# Patient Record
Sex: FEMALE | Race: BLACK OR AFRICAN AMERICAN | URBAN - METROPOLITAN AREA
[De-identification: names, ages, dates, MRNs, and addresses within clinical notes are randomized per-mention and may not be internally consistent; named-entity substitution may affect disease eponyms.]

---

## 2017-03-17 ENCOUNTER — TELEPHONE (OUTPATIENT)
Dept: FAMILY MEDICINE CLINIC | Age: 52
End: 2017-03-17

## 2017-03-17 NOTE — TELEPHONE ENCOUNTER
Phoned patient she stated taking Mucinex drainage clear no fever she also stated feeling better and will call Monday 3/20/2017 if not getting better.

## 2017-04-26 ENCOUNTER — OFFICE VISIT (OUTPATIENT)
Dept: FAMILY MEDICINE CLINIC | Age: 52
End: 2017-04-26

## 2017-04-26 VITALS
WEIGHT: 171.2 LBS | RESPIRATION RATE: 12 BRPM | TEMPERATURE: 97.4 F | HEIGHT: 67 IN | OXYGEN SATURATION: 95 % | SYSTOLIC BLOOD PRESSURE: 117 MMHG | BODY MASS INDEX: 26.87 KG/M2 | DIASTOLIC BLOOD PRESSURE: 71 MMHG | HEART RATE: 65 BPM

## 2017-04-26 DIAGNOSIS — Z83.3 FAMILY HISTORY OF DIABETES MELLITUS: ICD-10-CM

## 2017-04-26 DIAGNOSIS — R73.03 PREDIABETES: Primary | ICD-10-CM

## 2017-04-26 LAB — HBA1C MFR BLD HPLC: 5.3 %

## 2017-04-26 RX ORDER — BETAMETHASONE DIPROPIONATE 0.5 MG/G
OINTMENT TOPICAL 2 TIMES DAILY
Qty: 15 G | Refills: 0 | Status: SHIPPED | OUTPATIENT
Start: 2017-04-26 | End: 2017-06-01

## 2017-04-26 NOTE — PROGRESS NOTES
Gerry Koenig        Name and  verified          Chief Complaint   Patient presents with    Follow-up

## 2017-04-26 NOTE — PROGRESS NOTES
HISTORY OF PRESENT ILLNESS  Joann Sanchez is a 46 y.o. female. HPI     For follow-up with past medical history of prediabetic state and was told to have hypercholesterolemia stating that she has been getting her labs done as per his OB/GYN has been doing her lipid panel and hemoglobin A1c levels on every 3 months in a fasting state most recently she had her hemoglobin A1c of a1c 5.7%, hdl 79, ldl 110, tot chl 207, in addition she was told that these numbers are not into comfortable range patient need to get them checked at least every 3 months with him for a better outcome patient denies any complaint no family history of premature heart disease non-smoker no history of hypertension has an active lifestyle workout 30 minutes most days of the week watchful about her diet does not eat fatty food high cholesterol and fast food eating as much as she can her expensive organic food, today she is questioning her last hemoglobin A1c and her lipid panel results like to further discuss with abnormality at her office visit today otherwise patient does not have any other complaint any other concern at this time    Current Outpatient Prescriptions   Medication Sig Dispense Refill    betamethasone dipropionate (DIPROLENE) 0.05 % ointment Apply  to affected area two (2) times a day. 15 g 0    MULTIVITAMIN PO Take  by mouth.  ciclopirox (PENLAC) 8 % solution One drop twice 1 Bottle 5     Allergies   Allergen Reactions    Latex Hives    Erythromycin Hives and Itching     Past Medical History:   Diagnosis Date    Constipation     Family history of diabetes mellitus 5/6/2014    Hypercholesterolemia 5/6/2014    Onychomycosis 11/4/2015    Prediabetes 11/4/2015     History reviewed. No pertinent surgical history.   Family History   Problem Relation Age of Onset    Diabetes Mother     Psychiatric Disorder Mother     Heart Disease Maternal Grandmother      Social History   Substance Use Topics    Smoking status: Never Smoker    Smokeless tobacco: Never Used    Alcohol use Yes      Comment: Very ocassionally      Lab Results  Component Value Date/Time   WBC 9.2 05/04/2016 08:12 AM   HGB 13.4 05/04/2016 08:12 AM   HCT 39.6 05/04/2016 08:12 AM   PLATELET 720 20/65/6440 08:12 AM   MCV 90 05/04/2016 08:12 AM       Lab Results  Component Value Date/Time   Hemoglobin A1c 5.8 11/04/2015 08:21 AM   Hemoglobin A1c 5.8 05/07/2015 09:20 AM   Hemoglobin A1c 5.7 05/06/2014 09:46 AM   Glucose 90 05/04/2016 08:12 AM   LDL, calculated 89 05/04/2016 08:12 AM   Creatinine 0.86 05/04/2016 08:12 AM      Lab Results  Component Value Date/Time   Cholesterol, total 185 05/04/2016 08:12 AM   HDL Cholesterol 88 05/04/2016 08:12 AM   LDL, calculated 89 05/04/2016 08:12 AM   Triglyceride 38 05/04/2016 08:12 AM       Lab Results  Component Value Date/Time   ALT (SGPT) 15 05/04/2016 08:12 AM   AST (SGOT) 14 05/04/2016 08:12 AM   Alk. phosphatase 77 05/04/2016 08:12 AM   Bilirubin, total 0.3 05/04/2016 08:12 AM       Lab Results  Component Value Date/Time   GFR est AA 90 05/04/2016 08:12 AM   GFR est non-AA 78 05/04/2016 08:12 AM   Creatinine 0.86 05/04/2016 08:12 AM   BUN 14 05/04/2016 08:12 AM   Sodium 141 05/04/2016 08:12 AM   Potassium 4.3 05/04/2016 08:12 AM   Chloride 101 05/04/2016 08:12 AM   CO2 23 05/04/2016 08:12 AM         Review of Systems   Constitutional: Negative for chills and fever. HENT: Negative for ear pain and nosebleeds. Eyes: Negative for blurred vision, pain and discharge. Respiratory: Negative for shortness of breath. Cardiovascular: Negative for chest pain and leg swelling. Gastrointestinal: Negative for constipation, diarrhea, nausea and vomiting. Genitourinary: Negative for frequency. Musculoskeletal: Negative for joint pain. Skin: Negative for itching and rash. Neurological: Negative for headaches. Psychiatric/Behavioral: Negative for depression. The patient is not nervous/anxious.         Physical Exam Constitutional: She is oriented to person, place, and time. She appears well-developed and well-nourished. HENT:   Head: Normocephalic and atraumatic. Eyes: Conjunctivae and EOM are normal.   Neck: Normal range of motion. Neck supple. Cardiovascular: Normal rate, regular rhythm and normal heart sounds. No murmur heard. Pulmonary/Chest: Effort normal and breath sounds normal.   Abdominal: Soft. Bowel sounds are normal. She exhibits no distension. Musculoskeletal: Normal range of motion. She exhibits no edema. Neurological: She is alert and oriented to person, place, and time. Skin: No erythema. Psychiatric: Her behavior is normal.   Nursing note and vitals reviewed. ASSESSMENT and PLAN  Eddie Quinonez was seen today for follow-up. Diagnoses and all orders for this visit:    Prediabetes  -     betamethasone dipropionate (DIPROLENE) 0.05 % ointment; Apply  to affected area two (2) times a day. -     AMB POC HEMOGLOBIN A1C    Family history of diabetes mellitus  -     betamethasone dipropionate (DIPROLENE) 0.05 % ointment; Apply  to affected area two (2) times a day.   -     AMB POC HEMOGLOBIN A1C    Patient was told she does not have to stress herself out because of the slight elevation of her last hemoglobin A1c in fact her hemoglobin A1c was checked today and her level was at 5.3 percentile which indicate an non-prediabetic state in addition she has great amount with good cholesterol and based on the risk factors her bad cholesterol of 110 is also normal for her was told to continue with her active lifestyle diet exercise behavioral therapy she does not have to worry and stress herself out because of this slight abnormality therefore she does not have to have her level checked every 3 months if she like to be conservative regarding her labs she can have them check  once yearly at the most she acknowledged understanding and agreed with today's recommendation

## 2017-04-26 NOTE — MR AVS SNAPSHOT
Visit Information Date & Time Provider Department Dept. Phone Encounter #  
 4/26/2017  3:45 PM Christiano Hoff MD [address] [facility] [phone] [id number] Upcoming Health Maintenance Date Due DTaP/Tdap/Td series (1 - Tdap) 12/27/1984 FOBT Q 1 YEAR AGE 50-75 5/7/2016 INFLUENZA AGE 9 TO ADULT 8/1/2016 PAP AKA CERVICAL CYTOLOGY 5/7/2018 BREAST CANCER SCRN MAMMOGRAM 8/4/2018 Allergies as of 4/26/2017  Review Complete On: 4/26/2017 By: Kathy Zhu LPN Severity Noted Reaction Type Reactions Latex  05/06/2014   Side Effect Hives Erythromycin  05/06/2014   Side Effect Hives, Itching Current Immunizations  Reviewed on 5/4/2016 Name Date Influenza Vaccine 9/16/2015, 11/6/2013 Pneumococcal Polysaccharide (PPSV-23)  Deferred (Patient Refused) Tdap  Deferred (Patient Refused) Not reviewed this visit You Were Diagnosed With   
  
 Codes Comments Prediabetes    -  Primary ICD-10-CM: R73.03 
ICD-9-CM: 790.29 Family history of diabetes mellitus     ICD-10-CM: Z83.3 ICD-9-CM: V18.0 Vitals BP Pulse Temp Resp Height(growth percentile) Weight(growth percentile) 117/71 (BP 1 Location: Left arm, BP Patient Position: At rest) 65 97.4 °F (36.3 °C) (Oral) 12 5' 7\" (1.702 m) 171 lb 3.2 oz (77.7 kg) SpO2 BMI OB Status Smoking Status 95% 26.81 kg/m2 Having regular periods Never Smoker Vitals History BMI and BSA Data Body Mass Index Body Surface Area  
 26.81 kg/m 2 1.92 m 2 Preferred Pharmacy Pharmacy Name Phone [facility]/PHARMACY [id number], [place] - [address] [phone] Your Updated Medication List  
  
   
This list is accurate as of: 4/26/17  4:50 PM.  Always use your most recent med list.  
  
  
  
  
 betamethasone dipropionate 0.05 % ointment Commonly known as:  Jose Blanton  
 Apply  to affected area two (2) times a day. ciclopirox 8 % solution Commonly known as:  PENLAC One drop twice MULTIVITAMIN PO Take  by mouth. Prescriptions Sent to Pharmacy Refills  
 betamethasone dipropionate (DIPROLENE) 0.05 % ointment 0 Sig: Apply  to affected area two (2) times a day. Class: Normal  
 Pharmacy: [facility]/pharmacy [address]  #: [phone] Route: Topical  
  
We Performed the Following AMB POC HEMOGLOBIN A1C [09571 CPT(R)] Introducing [facility]! [employer] introduces [employer] patient portal. Now you can access parts of your medical record, email your doctor's office, and request medication refills online. 1. In your internet browser, go to [email or web address] 2. Click on the First Time User? Click Here link in the Sign In box. You will see the New Member Sign Up page. 3. Enter your [employer] Access Code exactly as it appears below. You will not need to use this code after youve completed the sign-up process. If you do not sign up before the expiration date, you must request a new code. · [employer] Access Code: [email or web address] Expires: 7/25/2017  4:50 PM 
 
4. Enter the last four digits of your Social Security Number (xxxx) and Date of Birth (mm/dd/yyyy) as indicated and click Submit. You will be taken to the next sign-up page. 5. Create a [employer] ID. This will be your Captricity login ID and cannot be changed, so think of one that is secure and easy to remember. 6. Create a [employer] password. You can change your password at any time. 7. Enter your Password Reset Question and Answer. This can be used at a later time if you forget your password. 8. Enter your e-mail address. You will receive e-mail notification when new information is available in [address] 9. Click Sign Up. You can now view and download portions of your medical record. 10. Click the Download Summary menu link to download a portable copy of your medical information. If you have questions, please visit the Frequently Asked Questions section of the [employer] website. Remember, [employer] is NOT to be used for urgent needs. For medical emergencies, dial 911. Now available from your iPhone and Android! Please provide this summary of care documentation to your next provider. Your primary care clinician is listed as Brooks Meza. If you have any questions after today's visit, please call [phone].

## 2017-06-01 RX ORDER — TRIAMCINOLONE ACETONIDE 1 MG/G
CREAM TOPICAL 2 TIMES DAILY
Qty: 45 G | Refills: 4 | Status: SHIPPED | OUTPATIENT
Start: 2017-06-01 | End: 2018-07-25 | Stop reason: ALTCHOICE

## 2018-06-28 ENCOUNTER — OFFICE VISIT (OUTPATIENT)
Dept: FAMILY MEDICINE CLINIC | Age: 53
End: 2018-06-28

## 2018-06-28 VITALS
SYSTOLIC BLOOD PRESSURE: 99 MMHG | HEIGHT: 67 IN | RESPIRATION RATE: 16 BRPM | WEIGHT: 176.2 LBS | DIASTOLIC BLOOD PRESSURE: 67 MMHG | BODY MASS INDEX: 27.65 KG/M2 | TEMPERATURE: 98.1 F | OXYGEN SATURATION: 96 % | HEART RATE: 65 BPM

## 2018-06-28 DIAGNOSIS — Z12.39 SCREENING FOR MALIGNANT NEOPLASM OF BREAST: ICD-10-CM

## 2018-06-28 DIAGNOSIS — Z00.00 WELL WOMAN EXAM (NO GYNECOLOGICAL EXAM): Primary | ICD-10-CM

## 2018-06-28 DIAGNOSIS — Z23 ENCOUNTER FOR IMMUNIZATION: ICD-10-CM

## 2018-06-28 DIAGNOSIS — Z12.39 SCREENING FOR BREAST CANCER: ICD-10-CM

## 2018-06-28 RX ORDER — POLYETHYLENE GLYCOL-3350 AND ELECTROLYTES WITH FLAVOR PACK 240; 5.84; 2.98; 6.72; 22.72 G/278.26G; G/278.26G; G/278.26G; G/278.26G; G/278.26G
POWDER, FOR SOLUTION ORAL
Refills: 0 | COMMUNITY
Start: 2018-04-11 | End: 2018-06-28 | Stop reason: ALTCHOICE

## 2018-06-28 NOTE — PROGRESS NOTES
Discussed the patient's BMI with her. The BMI follow up plan is as follows:     dietary management education, guidance, and counseling  encourage exercise  monitor weight  prescribed dietary intake    An After Visit Summary was printed and given to the patient. Subjective:   45 y.o. female for Well Woman Check. Her gyne and breast care is done elsewhere by her Ob-Gyne physician. Her gyne and breast care is done elsewhere by her Ob-Gyne physician. Present for CPE, last Complete Physical exam was 2015, not Up todate w/ all vaccination, last tetanus vaccine was in <10yrs ago refusing to get any vaccinations today,   last mammog was nl and 2017,  last pap smear normal and was in 2018,   last colonoscopy was 2018,  No past surgical hx,  last bone dexa scan was never  No family hx of breast cancer   no family hx of colon cancer,+++sexaully active and uses Safe sex, not having much of physically activity,  compliant w/ meds,no Rf needed for today for her meds.        Current Outpatient Prescriptions   Medication Sig Dispense Refill    MULTIVITAMIN PO Take  by mouth daily.  triamcinolone acetonide (KENALOG) 0.1 % topical cream Apply  to affected area two (2) times a day. use thin layer (Patient not taking: Reported on 6/28/2018) 45 g 4    ciclopirox (PENLAC) 8 % solution One drop twice (Patient not taking: Reported on 6/28/2018) 1 Bottle 5     Allergies   Allergen Reactions    Latex Hives    Erythromycin Hives and Itching     Past Medical History:   Diagnosis Date    Constipation     Family history of diabetes mellitus 5/6/2014    Hypercholesterolemia 5/6/2014    Onychomycosis 11/4/2015    Prediabetes 11/4/2015     History reviewed. No pertinent surgical history.   Family History   Problem Relation Age of Onset    Diabetes Mother     Psychiatric Disorder Mother     Heart Disease Maternal Grandmother      Social History   Substance Use Topics    Smoking status: Never Smoker    Smokeless tobacco: Never Used    Alcohol use Yes      Comment: Very ocassionally        Lab Results   Component Value Date/Time    WBC 9.2 05/04/2016 08:12 AM    HGB 13.4 05/04/2016 08:12 AM    HCT 39.6 05/04/2016 08:12 AM    PLATELET 374 35/78/1862 08:12 AM    MCV 90 05/04/2016 08:12 AM     Lab Results   Component Value Date/Time    Hemoglobin A1c 5.8 (H) 11/04/2015 08:21 AM    Hemoglobin A1c 5.8 (H) 05/07/2015 09:20 AM    Hemoglobin A1c 5.7 (H) 05/06/2014 09:46 AM    Glucose 90 05/04/2016 08:12 AM    LDL, calculated 89 05/04/2016 08:12 AM    Creatinine 0.86 05/04/2016 08:12 AM       Review of Systems    Constitutional: Negative for chills and fever, not obese okay body mass index for his age. HENT: Negative for ear head pain and nosebleeds. Eyes: Negative for blurred vision, pain and discharge. Respiratory: Negative for shortness of breath, wheezing cough sore throat. Cardiovascular: Negative for chest pain and leg swelling, racing heart . Gastrointestinal: Negative for constipation, diarrhea, nausea and vomiting. Genitourinary: Negative for frequency. Musculoskeletal: Negative for joint pain. Skin: Negative for itching, pimples or acne rash. Neurological: Negative for headaches. Psychiatric/Behavioral: Negative for depression has normal interest to do things and not depressed the patient is not nervous/anxious. Specific concerns today: hot flashes, in postmenopausal state, now with irreg menses,     Objective: The patient appears well, alert, oriented x 3, in no distress. Visit Vitals    BP 99/67 (BP 1 Location: Left arm, BP Patient Position: At rest)    Pulse 65    Temp 98.1 °F (36.7 °C) (Oral)    Resp 16    Ht 5' 7\" (1.702 m)    Wt 176 lb 3.2 oz (79.9 kg)    LMP 04/30/2018    SpO2 96%    BMI 27.6 kg/m2     ENT normal.  Neck supple. No adenopathy or thyromegaly. SUAD. Lungs are clear, good air entry, no wheezes, rhonchi or rales. S1 and S2 normal, no murmurs, regular rate and rhythm. Abdomen soft without tenderness, guarding, mass or organomegaly. Extremities show no edema, normal peripheral pulses. Neurological is normal, no focal findings. Breast and Pelvic exams are deferred. Assessment/Plan:   Well Woman      Diagnoses and all orders for this visit:    1. Well woman exam (no gynecological exam)  Comments:  [V70.0]  Orders:  -     CBC W/O DIFF  -     METABOLIC PANEL, COMPREHENSIVE  -     TSH 3RD GENERATION  -     LIPID PANEL  -     HEMOGLOBIN A1C WITH EAG    2. Screening for breast cancer  -     [facility] MAMMO BI SCREENING INCL CAD; Future    3. Screening for malignant neoplasm of breast    4.  Encounter for immunization  -     TETANUS, DIPHTHERIA TOXOIDS AND ACELLULAR PERTUSSIS VACCINE (TDAP), IN INDIVIDS. >=7, IM    lose weight, increase physical activity, continue present diet with no restrictions, follow low fat diet, follow low salt diet, continue present plan, routine labs ordered, call if any problems

## 2018-06-28 NOTE — PATIENT INSTRUCTIONS
Well Visit, Women 48 to 72: Care Instructions  Your Care Instructions    Physical exams can help you stay healthy. Your doctor has checked your overall health and may have suggested ways to take good care of yourself. He or she also may have recommended tests. At home, you can help prevent illness with healthy eating, regular exercise, and other steps. Follow-up care is a key part of your treatment and safety. Be sure to make and go to all appointments, and call your doctor if you are having problems. It's also a good idea to know your test results and keep a list of the medicines you take. How can you care for yourself at home? · Reach and stay at a healthy weight. This will lower your risk for many problems, such as obesity, diabetes, heart disease, and high blood pressure. · Get at least 30 minutes of exercise on most days of the week. Walking is a good choice. You also may want to do other activities, such as running, swimming, cycling, or playing tennis or team sports. · Do not smoke. Smoking can make health problems worse. If you need help quitting, talk to your doctor about stop-smoking programs and medicines. These can increase your chances of quitting for good. · Protect your skin from too much sun. When you're outdoors from 10 a.m. to 4 p.m., stay in the shade or cover up with clothing and a hat with a wide brim. Wear sunglasses that block UV rays. Even when it's cloudy, put broad-spectrum sunscreen (SPF 30 or higher) on any exposed skin. · See a dentist one or two times a year for checkups and to have your teeth cleaned. · Wear a seat belt in the car. · Limit alcohol to 1 drink a day. Too much alcohol can cause health problems. Follow your doctor's advice about when to have certain tests. These tests can spot problems early. · Cholesterol.  Your doctor will tell you how often to have this done based on your age, family history, or other things that can increase your risk for heart attack and stroke. · Blood pressure. Have your blood pressure checked during a routine doctor visit. Your doctor will tell you how often to check your blood pressure based on your age, your blood pressure results, and other factors. · Mammogram. Ask your doctor how often you should have a mammogram, which is an X-ray of your breasts. A mammogram can spot breast cancer before it can be felt and when it is easiest to treat. · Pap test and pelvic exam. Ask your doctor how often you should have a Pap test. You may not need to have a Pap test as often as you used to. · Vision. Have your eyes checked every year or two or as often as your doctor suggests. Some experts recommend that you have yearly exams for glaucoma and other age-related eye problems starting at age 48. · Hearing. Tell your doctor if you notice any change in your hearing. You can have tests to find out how well you hear. · Diabetes. Ask your doctor whether you should have tests for diabetes. · Colon cancer. You should begin tests for colon cancer at age 48. You may have one of several tests. Your doctor will tell you how often to have tests based on your age and risk. Risks include whether you already had a precancerous polyp removed from your colon or whether your parents, sisters and brothers, or children have had colon cancer. · Thyroid disease. Talk to your doctor about whether to have your thyroid checked as part of a regular physical exam. Women have an increased chance of a thyroid problem. · Osteoporosis. You should begin tests for bone density at age 72. If you are younger than 72, ask your doctor whether you have factors that may increase your risk for this disease. You may want to have this test before age 72. · Heart attack and stroke risk. At least every 4 to 6 years, you should have your risk for heart attack and stroke assessed.  Your doctor uses factors such as your age, blood pressure, cholesterol, and whether you smoke or have diabetes to show what your risk for a heart attack or stroke is over the next 10 years. When should you call for help? Watch closely for changes in your health, and be sure to contact your doctor if you have any problems or symptoms that concern you. Where can you learn more? Go to http://brenda-javi.info/. Enter U561 in the search box to learn more about \"Well Visit, Women 50 to 72: Care Instructions. \"  Current as of: May 12, 2017  Content Version: 11.4  © 1489-9881 [employer]. Care instructions adapted under license by [email or web address] (which disclaims liability or warranty for this information). If you have questions about a medical condition or this instruction, always ask your healthcare professional. [address] any warranty or liability for your use of this information. Body Mass Index: Care Instructions  Your Care Instructions    Body mass index (BMI) can help you see if your weight is raising your risk for health problems. It uses a formula to compare how much you weigh with how tall you are. · A BMI lower than 18.5 is considered underweight. · A BMI between 18.5 and 24.9 is considered healthy. · A BMI between 25 and 29.9 is considered overweight. A BMI of 30 or higher is considered obese. If your BMI is in the normal range, it means that you have a lower risk for weight-related health problems. If your BMI is in the overweight or obese range, you may be at increased risk for weight-related health problems, such as high blood pressure, heart disease, stroke, arthritis or joint pain, and diabetes. If your BMI is in the underweight range, you may be at increased risk for health problems such as fatigue, lower protection (immunity) against illness, muscle loss, bone loss, hair loss, and hormone problems. BMI is just one measure of your risk for weight-related health problems.  You may be at higher risk for health problems if you are not active, you eat an unhealthy diet, or you drink too much alcohol or use tobacco products. Follow-up care is a key part of your treatment and safety. Be sure to make and go to all appointments, and call your doctor if you are having problems. It's also a good idea to know your test results and keep a list of the medicines you take. How can you care for yourself at home? · Practice healthy eating habits. This includes eating plenty of fruits, vegetables, whole grains, lean protein, and low-fat dairy. · If your doctor recommends it, get more exercise. Walking is a good choice. Bit by bit, increase the amount you walk every day. Try for at least 30 minutes on most days of the week. · Do not smoke. Smoking can increase your risk for health problems. If you need help quitting, talk to your doctor about stop-smoking programs and medicines. These can increase your chances of quitting for good. · Limit alcohol to 2 drinks a day for men and 1 drink a day for women. Too much alcohol can cause health problems. If you have a BMI higher than 25  · Your doctor may do other tests to check your risk for weight-related health problems. This may include measuring the distance around your waist. A waist measurement of more than 40 inches in men or 35 inches in women can increase the risk of weight-related health problems. · Talk with your doctor about steps you can take to stay healthy or improve your health. You may need to make lifestyle changes to lose weight and stay healthy, such as changing your diet and getting regular exercise. If you have a BMI lower than 18.5  · Your doctor may do other tests to check your risk for health problems. · Talk with your doctor about steps you can take to stay healthy or improve your health. You may need to make lifestyle changes to gain or maintain weight and stay healthy, such as getting more healthy foods in your diet and doing exercises to build muscle. Where can you learn more?   Go to http://brenda-javi.info/. Enter S176 in the search box to learn more about \"Body Mass Index: Care Instructions. \"  Current as of: October 13, 2016  Content Version: 11.4  © 0454-0965 [employer]. Care instructions adapted under license by [employer] (which disclaims liability or warranty for this information). If you have questions about a medical condition or this instruction, always ask your healthcare professional. [address] any warranty or liability for yo     Insomnia: Care Instructions  Your Care Instructions    Insomnia is the inability to sleep well. It is a common problem for most people at some time. Insomnia may make it hard for you to get to sleep, stay asleep, or sleep as long as you need to. This can make you tired and grouchy during the day. It can also make you forgetful, less effective at work, and unhappy. Insomnia can be caused by conditions such as depression or anxiety. Pain can also affect your ability to sleep. When these problems are solved, the insomnia usually clears up. But sometimes bad sleep habits can cause insomnia. If insomnia is affecting your work or your enjoyment of life, you can take steps to improve your sleep. Follow-up care is a key part of your treatment and safety. Be sure to make and go to all appointments, and call your doctor if you are having problems. It's also a good idea to know your test results and keep a list of the medicines you take. How can you care for yourself at home? What to avoid  · Do not have drinks with caffeine, such as coffee or black tea, for 8 hours before bed. · Do not smoke or use other types of tobacco near bedtime. Nicotine is a stimulant and can keep you awake. · Avoid drinking alcohol late in the evening, because it can cause you to wake in the middle of the night. · Do not eat a big meal close to bedtime. If you are hungry, eat a light snack.   · Do not drink a lot of water close to bedtime, because the need to urinate may wake you up during the night. · Do not read or watch TV in bed. Use the bed only for sleeping and sexual activity. What to try  · Go to bed at the same time every night, and wake up at the same time every morning. Do not take naps during the day. · Keep your bedroom quiet, dark, and cool. · Sleep on a comfortable pillow and mattress. · If watching the clock makes you anxious, turn it facing away from you so you cannot see the time. · If you worry when you lie down, start a worry book. Well before bedtime, write down your worries, and then set the book and your concerns aside. · Try meditation or other relaxation techniques before you go to bed. · If you cannot fall asleep, get up and go to another room until you feel sleepy. Do something relaxing. Repeat your bedtime routine before you go to bed again. · Make your house quiet and calm about an hour before bedtime. Turn down the lights, turn off the TV, log off the computer, and turn down the volume on music. This can help you relax after a busy day. When should you call for help? Watch closely for changes in your health, and be sure to contact your doctor if:  ? · Your efforts to improve your sleep do not work. ? · Your insomnia gets worse. ? · You have been feeling down, depressed, or hopeless or have lost interest in things that you usually enjoy. Where can you learn more? Go to http://brenda-javi.info/. Enter P513 in the search box to learn more about \"Insomnia: Care Instructions. \"  Current as of: July 26, 2016  Content Version: 11.4  © 4186-3308 [employer]. Care instructions adapted under license by [employer] (which disclaims liability or warranty for this information).  If you have questions about a medical condition or this instruction, always ask your healthcare professional. [employer] disclaims any warranty or liability for your use of this information. ur use of this information.

## 2018-06-28 NOTE — MR AVS SNAPSHOT
[address] [facility] [place] [facility] [id number] 
[phone] Patient: Livier Born MRN: [id number] :1965 Visit Information Date & Time Provider Department Dept. Phone Encounter #  
 2018  9:00 AM Walter Leon MD  [facility] OFFICE-ANNEX [phone] [id number] Follow-up Instructions Return in about 1 year (around 2019), or if symptoms worsen or fail to improve. Upcoming Health Maintenance Date Due FOBT Q 1 YEAR AGE 50-75 2016 PAP AKA CERVICAL CYTOLOGY 2018 BREAST CANCER SCRN MAMMOGRAM 2018 DTaP/Tdap/Td series (1 - Tdap) 10/31/2018* Influenza Age 5 to Adult 2018 *Topic was postponed. The date shown is not the original due date. Allergies as of 2018  Review Complete On: 2018 By: Walter Leon MD  
  
 Severity Noted Reaction Type Reactions Latex  2014   Side Effect Hives Erythromycin  2014   Side Effect Hives, Itching Current Immunizations  Reviewed on 2016 Name Date Influenza Vaccine 2015, 2013 Pneumococcal Polysaccharide (PPSV-23)  Deferred (Patient Refused) Tdap  Incomplete,  Deferred (Patient Refused) Not reviewed this visit You Were Diagnosed With   
  
 Codes Comments Well woman exam (no gynecological exam)    -  Primary ICD-10-CM: Z00.00 ICD-9-CM: V70.0 [V70.0] Screening for breast cancer     ICD-10-CM: Z12.31 
ICD-9-CM: V76.10 Screening for malignant neoplasm of breast     ICD-10-CM: Z12.31 
ICD-9-CM: V76.10 Encounter for immunization     ICD-10-CM: T80 ICD-9-CM: V03.89 Vitals BP Pulse Temp Resp Height(growth percentile) Weight(growth percentile) 99/67 (BP 1 Location: Left arm, BP Patient Position: At rest) 65 98.1 °F (36.7 °C) (Oral) 16 5' 7\" (1.702 m) 176 lb 3.2 oz (79.9 kg) LMP SpO2 BMI OB Status Smoking Status 04/30/2018 96% 27.6 kg/m2 Having regular periods Never Smoker Vitals History BMI and BSA Data Body Mass Index Body Surface Area  
 27.6 kg/m 2 1.94 m 2 Preferred Pharmacy Pharmacy Name Phone [id number]- [place], [place] - [address] [phone] Your Updated Medication List  
  
   
This list is accurate as of 6/28/18  9:49 AM.  Always use your most recent med list.  
  
  
  
  
 ciclopirox 8 % solution Commonly known as:  PENLAC One drop twice MULTIVITAMIN PO Take  by mouth daily. triamcinolone acetonide 0.1 % topical cream  
Commonly known as:  KENALOG Apply  to affected area two (2) times a day. use thin layer We Performed the Following CBC W/O DIFF [81112 CPT(R)] HEMOGLOBIN A1C WITH EAG [61126 CPT(R)] LIPID PANEL [46576 CPT(R)] METABOLIC PANEL, COMPREHENSIVE [63437 CPT(R)] TETANUS, DIPHTHERIA TOXOIDS AND ACELLULAR PERTUSSIS VACCINE (TDAP), IN INDIVIDS. >=7, IM [id number] CPT(R)] TSH 3RD GENERATION [79181 CPT(R)] Follow-up Instructions Return in about 1 year (around 6/28/2019), or if symptoms worsen or fail to improve. To-Do List   
 06/28/2018 Imaging:  [facility] MAMMO BI SCREENING INCL CAD Patient Instructions Well Visit, Women 48 to 72: Care Instructions Your Care Instructions Physical exams can help you stay healthy. Your doctor has checked your overall health and may have suggested ways to take good care of yourself. He or she also may have recommended tests. At home, you can help prevent illness with healthy eating, regular exercise, and other steps. Follow-up care is a key part of your treatment and safety. Be sure to make and go to all appointments, and call your doctor if you are having problems. It's also a good idea to know your test results and keep a list of the medicines you take. How can you care for yourself at home? · Reach and stay at a healthy weight. This will lower your risk for many problems, such as obesity, diabetes, heart disease, and high blood pressure. · Get at least 30 minutes of exercise on most days of the week. Walking is a good choice. You also may want to do other activities, such as running, swimming, cycling, or playing tennis or team sports. · Do not smoke. Smoking can make health problems worse. If you need help quitting, talk to your doctor about stop-smoking programs and medicines. These can increase your chances of quitting for good. · Protect your skin from too much sun. When you're outdoors from 10 a.m. to 4 p.m., stay in the shade or cover up with clothing and a hat with a wide brim. Wear sunglasses that block UV rays. Even when it's cloudy, put broad-spectrum sunscreen (SPF 30 or higher) on any exposed skin. · See a dentist one or two times a year for checkups and to have your teeth cleaned. · Wear a seat belt in the car. · Limit alcohol to 1 drink a day. Too much alcohol can cause health problems. Follow your doctor's advice about when to have certain tests. These tests can spot problems early. · Cholesterol. Your doctor will tell you how often to have this done based on your age, family history, or other things that can increase your risk for heart attack and stroke. · Blood pressure. Have your blood pressure checked during a routine doctor visit. Your doctor will tell you how often to check your blood pressure based on your age, your blood pressure results, and other factors. · Mammogram. Ask your doctor how often you should have a mammogram, which is an X-ray of your breasts. A mammogram can spot breast cancer before it can be felt and when it is easiest to treat. · Pap test and pelvic exam. Ask your doctor how often you should have a Pap test. You may not need to have a Pap test as often as you used to. · Vision.  Have your eyes checked every year or two or as often as your doctor suggests. Some experts recommend that you have yearly exams for glaucoma and other age-related eye problems starting at age 48. · Hearing. Tell your doctor if you notice any change in your hearing. You can have tests to find out how well you hear. · Diabetes. Ask your doctor whether you should have tests for diabetes. · Colon cancer. You should begin tests for colon cancer at age 48. You may have one of several tests. Your doctor will tell you how often to have tests based on your age and risk. Risks include whether you already had a precancerous polyp removed from your colon or whether your parents, sisters and brothers, or children have had colon cancer. · Thyroid disease. Talk to your doctor about whether to have your thyroid checked as part of a regular physical exam. Women have an increased chance of a thyroid problem. · Osteoporosis. You should begin tests for bone density at age 72. If you are younger than 72, ask your doctor whether you have factors that may increase your risk for this disease. You may want to have this test before age 72. · Heart attack and stroke risk. At least every 4 to 6 years, you should have your risk for heart attack and stroke assessed. Your doctor uses factors such as your age, blood pressure, cholesterol, and whether you smoke or have diabetes to show what your risk for a heart attack or stroke is over the next 10 years. When should you call for help? Watch closely for changes in your health, and be sure to contact your doctor if you have any problems or symptoms that concern you. Where can you learn more? Go to http://brenda-javi.info/. Enter S175 in the search box to learn more about \"Well Visit, Women 50 to 72: Care Instructions. \" Current as of: May 12, 2017 Content Version: 11.4 © 9284-2525 [employer].  Care instructions adapted under license by [employer] (which disclaims liability or warranty for this information). If you have questions about a medical condition or this instruction, always ask your healthcare professional. [address] any warranty or liability for your use of this information. Body Mass Index: Care Instructions Your Care Instructions Body mass index (BMI) can help you see if your weight is raising your risk for health problems. It uses a formula to compare how much you weigh with how tall you are. · A BMI lower than 18.5 is considered underweight. · A BMI between 18.5 and 24.9 is considered healthy. · A BMI between 25 and 29.9 is considered overweight. A BMI of 30 or higher is considered obese. If your BMI is in the normal range, it means that you have a lower risk for weight-related health problems. If your BMI is in the overweight or obese range, you may be at increased risk for weight-related health problems, such as high blood pressure, heart disease, stroke, arthritis or joint pain, and diabetes. If your BMI is in the underweight range, you may be at increased risk for health problems such as fatigue, lower protection (immunity) against illness, muscle loss, bone loss, hair loss, and hormone problems. BMI is just one measure of your risk for weight-related health problems. You may be at higher risk for health problems if you are not active, you eat an unhealthy diet, or you drink too much alcohol or use tobacco products. Follow-up care is a key part of your treatment and safety. Be sure to make and go to all appointments, and call your doctor if you are having problems. It's also a good idea to know your test results and keep a list of the medicines you take. How can you care for yourself at home? · Practice healthy eating habits. This includes eating plenty of fruits, vegetables, whole grains, lean protein, and low-fat dairy. · If your doctor recommends it, get more exercise.  Walking is a good choice. Bit by bit, increase the amount you walk every day. Try for at least 30 minutes on most days of the week. · Do not smoke. Smoking can increase your risk for health problems. If you need help quitting, talk to your doctor about stop-smoking programs and medicines. These can increase your chances of quitting for good. · Limit alcohol to 2 drinks a day for men and 1 drink a day for women. Too much alcohol can cause health problems. If you have a BMI higher than 25 · Your doctor may do other tests to check your risk for weight-related health problems. This may include measuring the distance around your waist. A waist measurement of more than 40 inches in men or 35 inches in women can increase the risk of weight-related health problems. · Talk with your doctor about steps you can take to stay healthy or improve your health. You may need to make lifestyle changes to lose weight and stay healthy, such as changing your diet and getting regular exercise. If you have a BMI lower than 18.5 · Your doctor may do other tests to check your risk for health problems. · Talk with your doctor about steps you can take to stay healthy or improve your health. You may need to make lifestyle changes to gain or maintain weight and stay healthy, such as getting more healthy foods in your diet and doing exercises to build muscle. Where can you learn more? Go to http://brenda-javi.info/. Enter S176 in the search box to learn more about \"Body Mass Index: Care Instructions. \" Current as of: October 13, 2016 Content Version: 11.4 © 0430-5163 [employer]wise, [employer].. Care instructions adapted under license by [employer] (which disclaims liability or warranty for this information). If you have questions about a medical condition or this instruction, always ask your healthcare professional. Edwin Pennington disclaims any warranty or liability for yo Insomnia: Care Instructions Your Care Instructions Insomnia is the inability to sleep well. It is a common problem for most people at some time. Insomnia may make it hard for you to get to sleep, stay asleep, or sleep as long as you need to. This can make you tired and grouchy during the day. It can also make you forgetful, less effective at work, and unhappy. Insomnia can be caused by conditions such as depression or anxiety. Pain can also affect your ability to sleep. When these problems are solved, the insomnia usually clears up. But sometimes bad sleep habits can cause insomnia. If insomnia is affecting your work or your enjoyment of life, you can take steps to improve your sleep. Follow-up care is a key part of your treatment and safety. Be sure to make and go to all appointments, and call your doctor if you are having problems. It's also a good idea to know your test results and keep a list of the medicines you take. How can you care for yourself at home? What to avoid · Do not have drinks with caffeine, such as coffee or black tea, for 8 hours before bed. · Do not smoke or use other types of tobacco near bedtime. Nicotine is a stimulant and can keep you awake. · Avoid drinking alcohol late in the evening, because it can cause you to wake in the middle of the night. · Do not eat a big meal close to bedtime. If you are hungry, eat a light snack. · Do not drink a lot of water close to bedtime, because the need to urinate may wake you up during the night. · Do not read or watch TV in bed. Use the bed only for sleeping and sexual activity. What to try · Go to bed at the same time every night, and wake up at the same time every morning. Do not take naps during the day. · Keep your bedroom quiet, dark, and cool. · Sleep on a comfortable pillow and mattress. · If watching the clock makes you anxious, turn it facing away from you so you cannot see the time. · If you worry when you lie down, start a worry book. Well before bedtime, write down your worries, and then set the book and your concerns aside. · Try meditation or other relaxation techniques before you go to bed. · If you cannot fall asleep, get up and go to another room until you feel sleepy. Do something relaxing. Repeat your bedtime routine before you go to bed again. · Make your house quiet and calm about an hour before bedtime. Turn down the lights, turn off the TV, log off the computer, and turn down the volume on music. This can help you relax after a busy day. When should you call for help? Watch closely for changes in your health, and be sure to contact your doctor if: 
? · Your efforts to improve your sleep do not work. ? · Your insomnia gets worse. ? · You have been feeling down, depressed, or hopeless or have lost interest in things that you usually enjoy. Where can you learn more? Go to http://brenda-javi.info/. Enter P513 in the search box to learn more about \"Insomnia: Care Instructions. \" Current as of: July 26, 2016 Content Version: 11.4 © 5525-0781 [employer]. Care instructions adapted under license by [employer] (which disclaims liability or warranty for this information). If you have questions about a medical condition or this instruction, always ask your healthcare professional. [address] any warranty or liability for your use of this information. ur use of this information. Introducing [facility]! Bhupinder Ramachandran introduces [employer] patient portal. Now you can access parts of your medical record, email your doctor's office, and request medication refills online. 1. In your internet browser, go to [email or web address] 2. Click on the First Time User? Click Here link in the Sign In box. You will see the New Member Sign Up page. 3. Enter your Autobook Now Access Code exactly as it appears below. You will not need to use this code after youve completed the sign-up process. If you do not sign up before the expiration date, you must request a new code. · Autobook Now Access Code: [email or web address] Expires: 9/26/2018  9:44 AM 
 
4. Enter the last four digits of your Social Security Number (xxxx) and Date of Birth (mm/dd/yyyy) as indicated and click Submit. You will be taken to the next sign-up page. 5. Create a Autobook Now ID. This will be your Autobook Now login ID and cannot be changed, so think of one that is secure and easy to remember. 6. Create a Autobook Now password. You can change your password at any time. 7. Enter your Password Reset Question and Answer. This can be used at a later time if you forget your password. 8. Enter your e-mail address. You will receive e-mail notification when new information is available in [address]. 9. Click Sign Up. You can now view and download portions of your medical record. 10. Click the Download Summary menu link to download a portable copy of your medical information. If you have questions, please visit the Frequently Asked Questions section of the Autobook Now website. Remember, Autobook Now is NOT to be used for urgent needs. For medical emergencies, dial 911. Now available from your iPhone and Android! Please provide this summary of care documentation to your next provider. Your primary care clinician is listed as Mariela Smith. If you have any questions after today's visit, please call [phone].

## 2018-06-28 NOTE — PROGRESS NOTES
Name and  verified        Chief Complaint   Patient presents with    [facility] reviewed-discussed with patient. 1. Have you been to the ER, urgent care clinic since your last visit? Hospitalized since your last visit? No    2. Have you seen or consulted any other health care providers outside of the [address] [place] [address] since your last visit? Include any pap smears or colon screening. Yes, Retinal provider in  and OB/GYN in . Patient declined TD AP vaccine. Patient stated last colonoscopy .

## 2018-06-29 LAB
ALBUMIN SERPL-MCNC: 4 G/DL (ref 3.5–5.5)
ALBUMIN/GLOB SERPL: 1.3 {RATIO} (ref 1.2–2.2)
ALP SERPL-CCNC: 82 IU/L (ref 39–117)
ALT SERPL-CCNC: 22 IU/L (ref 0–32)
AST SERPL-CCNC: 20 IU/L (ref 0–40)
BILIRUB SERPL-MCNC: 0.3 MG/DL (ref 0–1.2)
BUN SERPL-MCNC: 13 MG/DL (ref 6–24)
BUN/CREAT SERPL: 15 (ref 9–23)
CALCIUM SERPL-MCNC: 9.5 MG/DL (ref 8.7–10.2)
CHLORIDE SERPL-SCNC: 103 MMOL/L (ref 96–106)
CHOLEST SERPL-MCNC: 189 MG/DL (ref 100–199)
CO2 SERPL-SCNC: 24 MMOL/L (ref 20–29)
CREAT SERPL-MCNC: 0.88 MG/DL (ref 0.57–1)
ERYTHROCYTE [DISTWIDTH] IN BLOOD BY AUTOMATED COUNT: 14 % (ref 12.3–15.4)
EST. AVERAGE GLUCOSE BLD GHB EST-MCNC: 108 MG/DL
GLOBULIN SER CALC-MCNC: 3.1 G/DL (ref 1.5–4.5)
GLUCOSE SERPL-MCNC: 85 MG/DL (ref 65–99)
HBA1C MFR BLD: 5.4 % (ref 4.8–5.6)
HCT VFR BLD AUTO: 39.7 % (ref 34–46.6)
HDLC SERPL-MCNC: 79 MG/DL
HGB BLD-MCNC: 13.1 G/DL (ref 11.1–15.9)
LDLC SERPL CALC-MCNC: 101 MG/DL (ref 0–99)
MCH RBC QN AUTO: 29.8 PG (ref 26.6–33)
MCHC RBC AUTO-ENTMCNC: 33 G/DL (ref 31.5–35.7)
MCV RBC AUTO: 90 FL (ref 79–97)
PLATELET # BLD AUTO: 331 X10E3/UL (ref 150–379)
POTASSIUM SERPL-SCNC: 4.4 MMOL/L (ref 3.5–5.2)
PROT SERPL-MCNC: 7.1 G/DL (ref 6–8.5)
RBC # BLD AUTO: 4.4 X10E6/UL (ref 3.77–5.28)
SODIUM SERPL-SCNC: 140 MMOL/L (ref 134–144)
TRIGL SERPL-MCNC: 43 MG/DL (ref 0–149)
TSH SERPL DL<=0.005 MIU/L-ACNC: 1.23 UIU/ML (ref 0.45–4.5)
VLDLC SERPL CALC-MCNC: 9 MG/DL (ref 5–40)
WBC # BLD AUTO: 8.4 X10E3/UL (ref 3.4–10.8)

## 2019-05-28 ENCOUNTER — OFFICE VISIT (OUTPATIENT)
Dept: FAMILY MEDICINE CLINIC | Age: 54
End: 2019-05-28

## 2019-05-28 VITALS
TEMPERATURE: 98.7 F | SYSTOLIC BLOOD PRESSURE: 125 MMHG | OXYGEN SATURATION: 98 % | HEART RATE: 68 BPM | RESPIRATION RATE: 16 BRPM | DIASTOLIC BLOOD PRESSURE: 85 MMHG | WEIGHT: 173 LBS | BODY MASS INDEX: 27.15 KG/M2 | HEIGHT: 67 IN

## 2019-05-28 DIAGNOSIS — M25.552 PAIN OF LEFT HIP JOINT: ICD-10-CM

## 2019-05-28 DIAGNOSIS — L23.2 ALLERGIC CONTACT DERMATITIS DUE TO COSMETICS: Primary | ICD-10-CM

## 2019-05-28 DIAGNOSIS — M72.2 PLANTAR FASCIITIS, BILATERAL: ICD-10-CM

## 2019-05-28 RX ORDER — BETAMETHASONE DIPROPIONATE 0.5 MG/G
CREAM TOPICAL 2 TIMES DAILY
Qty: 45 G | Refills: 6 | Status: SHIPPED | OUTPATIENT
Start: 2019-05-28 | End: 2019-05-28

## 2019-05-28 RX ORDER — TRIAMCINOLONE ACETONIDE 5 MG/G
CREAM TOPICAL 2 TIMES DAILY
Qty: 454 G | Refills: 2 | Status: SHIPPED | OUTPATIENT
Start: 2019-05-28 | End: 2021-01-21 | Stop reason: SDUPTHER

## 2019-05-28 RX ORDER — HYDROXYZINE 25 MG/1
25 TABLET, FILM COATED ORAL
Qty: 20 TAB | Refills: 0 | Status: SHIPPED | OUTPATIENT
Start: 2019-05-28 | End: 2019-06-07

## 2019-05-28 NOTE — PROGRESS NOTES
HISTORY OF PRESENT ILLNESS  Jas Garcia is a 52 y.o. female. HPI   Rash of the whole body arms legs upper thighs, back and front torso, stating that this happens after using sunscreens,   Started few weeks ago not better tried alcohol washing and OTC antibiotic ointments, dosenot tingles and not pain full, states that is notexpanding red, and not  swelled up,  Small rounds papules, with itchiness  Foot left pain  Patient presents stating that it has been noticing the heel pain right greater than left the pain is 6 out of 10 worse in the morning gets better with activity sharp no numbness no tingling sensation stating that the pt has been dealing with this for many months now worsening unfortunately the weight is into morbid obesity state    HIP  pain  The history is provided by the patient. This is a chronic problem. Episode onset: 3yrs ago, pt's MBI is at 27.1, pt  is working at this time but is cumbersome for the patient, to do any mopping, heavy pushing and lifting, , pt is currently able to walk w/out any mobility device. and the patient states that the pain is worsening specially by going up and down the steps . The problem occurs constantly. The problem has changed and worsening since onset. The pain is present in the LT Hip area. is a dull pain and  is at a severity of 6/10. the pt has the AM stiffness, but denies numbness and tingling sensations  There has been no history of extremity trauma. no incontinence of urine nor of stool, and no lower ext Weaknesses      Current Outpatient Medications   Medication Sig Dispense Refill    hydrOXYzine HCl (ATARAX) 25 mg tablet Take 1 Tab by mouth nightly as needed for Itching for up to 10 days. 20 Tab 0    triamcinolone (ARISTOCORT) 0.5 % topical cream Apply  to affected area two (2) times a day. use thin layer 454 g 2    MULTIVITAMIN PO Take  by mouth daily.        Allergies   Allergen Reactions    Latex Hives    Erythromycin Hives and Itching     Past Medical History:   Diagnosis Date    Constipation     Family history of diabetes mellitus 5/6/2014    Hypercholesterolemia 5/6/2014    Onychomycosis 11/4/2015    Prediabetes 11/4/2015     History reviewed. No pertinent surgical history. Family History   Problem Relation Age of Onset    Diabetes Mother     Psychiatric Disorder Mother     Heart Disease Maternal Grandmother      Social History     Tobacco Use    Smoking status: Never Smoker    Smokeless tobacco: Never Used   Substance Use Topics    Alcohol use: Yes     Comment: Very ocassionally      Lab Results   Component Value Date/Time    WBC 8.4 06/28/2018 09:55 AM    HGB 13.1 06/28/2018 09:55 AM    HCT 39.7 06/28/2018 09:55 AM    PLATELET 270 20/52/0387 09:55 AM    MCV 90 06/28/2018 09:55 AM     Lab Results   Component Value Date/Time    GFR est non-AA 75 06/28/2018 09:55 AM    GFR est AA 86 06/28/2018 09:55 AM    Creatinine 0.88 06/28/2018 09:55 AM    BUN 13 06/28/2018 09:55 AM    Sodium 140 06/28/2018 09:55 AM    Potassium 4.4 06/28/2018 09:55 AM    Chloride 103 06/28/2018 09:55 AM    CO2 24 06/28/2018 09:55 AM        Review of Systems   Constitutional: Negative for chills and fever. HENT: Negative for ear pain and nosebleeds. Eyes: Negative for blurred vision, pain and discharge. Respiratory: Negative for shortness of breath. Cardiovascular: Negative for chest pain and leg swelling. Gastrointestinal: Negative for constipation, diarrhea, nausea and vomiting. Genitourinary: Negative for frequency. Musculoskeletal: Positive for back pain, joint pain and myalgias. Skin: Positive for itching and rash. Neurological: Negative for headaches. Psychiatric/Behavioral: Negative for depression. The patient is not nervous/anxious. Physical Exam   Constitutional: She is oriented to person, place, and time. She appears well-developed and well-nourished. HENT:   Head: Normocephalic and atraumatic.    Eyes: Conjunctivae and EOM are normal. Neck: Normal range of motion. Neck supple. Cardiovascular: Normal rate, regular rhythm and normal heart sounds. No murmur heard. Pulmonary/Chest: Effort normal and breath sounds normal.   Abdominal: Soft. Bowel sounds are normal. She exhibits no distension. Musculoskeletal: She exhibits tenderness. She exhibits no edema. Left hip: She exhibits decreased range of motion, decreased strength and tenderness. Right foot: There is decreased range of motion and tenderness. Left foot: There is tenderness and bony tenderness. Neurological: She is alert and oriented to person, place, and time. Skin: No erythema. Psychiatric: Her behavior is normal.   Nursing note and vitals reviewed. ASSESSMENT and PLAN  Diagnoses and all orders for this visit:    1. Allergic contact dermatitis due to cosmetics  -     FOOD ALLERGY PROFILE  -     ALLERGEN PROFILE, MINI-PANEL    2. Pain of left hip joint  -     hydrOXYzine HCl (ATARAX) 25 mg tablet; Take 1 Tab by mouth nightly as needed for Itching for up to 10 days. 3. Plantar fasciitis, bilateral  -     hydrOXYzine HCl (ATARAX) 25 mg tablet; Take 1 Tab by mouth nightly as needed for Itching for up to 10 days. Other orders  -     triamcinolone (ARISTOCORT) 0.5 % topical cream; Apply  to affected area two (2) times a day. 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   Dependency and tolerancy were also addressed,  meds side effects and compliancy advised,  Call or rtc if worsens,   Pt agreed with today's recommendations.

## 2019-05-28 NOTE — PROGRESS NOTES
Chief Complaint   Patient presents with    Rash     1. Have you been to the ER, urgent care clinic since your last visit? Hospitalized since your last visit? No    2. Have you seen or consulted any other health care providers outside of the [address] [place] [address] since your last visit? Include any pap smears or colon screening. No      C/o generalized red  rash and itchiing x 1 week. Using otc creams with no relief.

## 2019-05-28 NOTE — PATIENT INSTRUCTIONS
Sacroiliac Pain: Exercises Your Care Instructions Here are some examples of typical rehabilitation exercises for your condition. Start each exercise slowly. Ease off the exercise if you start to have pain. Your doctor or physical therapist will tell you when you can start these exercises and which ones will work best for you. How to do the exercises Knee-to-chest stretch 1. Do not do the knee-to-chest exercise if it causes or increases back or leg pain. 2. Lie on your back with your knees bent and your feet flat on the floor. You can put a small pillow under your head and neck if it is more comfortable. 3. Grasp your hands under one knee and bring the knee to your chest, keeping the other foot flat on the floor. 4. Keep your lower back pressed to the floor. Hold for at least 15 to 30 seconds. 5. Relax and lower the knee to the starting position. Repeat with the other leg. 6. Repeat 2 to 4 times with each leg. 7. To get more stretch, keep your other leg flat on the floor while pulling your knee to your chest. 
 
Bridging Plantar Fasciitis: Exercises Your Care Instructions Here are some examples of typical rehabilitation exercises for your condition. Start each exercise slowly. Ease off the exercise if you start to have pain. Your doctor or physical therapist will tell you when you can start these exercises and which ones will work best for you. How to do the exercises Towel stretch 8. Sit with your legs extended and knees straight. 9. Place a towel around your foot just under the toes. 10. Hold each end of the towel in each hand, with your hands above your knees. 11. Pull back with the towel so that your foot stretches toward you. 12. Hold the position for at least 15 to 30 seconds. 13. Repeat 2 to 4 times a session, up to 5 sessions a day. Calf stretch 1. Stand facing a wall with your hands on the wall at about eye level.  Put the leg you want to stretch about a step behind your other leg. 2. Keeping your back heel on the floor, bend your front knee until you feel a stretch in the back leg. 3. Hold the stretch for 15 to 30 seconds. Repeat 2 to 4 times. Plantar fascia and calf stretch 1. Stand on a step as shown above. Be sure to hold on to the banister. 2. Slowly let your heels down over the edge of the step as you relax your calf muscles. You should feel a gentle stretch across the bottom of your foot and up the back of your leg to your knee. 3. Hold the stretch about 15 to 30 seconds, and then tighten your calf muscle a little to bring your heel back up to the level of the step. Repeat 2 to 4 times. Towel curls 1. While sitting, place your foot on a towel on the floor and scrunch the towel toward you with your toes. 2. Then, also using your toes, push the towel away from you. Seadrift pickups 1. Put marbles on the floor next to a cup. 
2. Using your toes, try to lift the marbles up from the floor and put them in the cup. Follow-up care is a key part of your treatment and safety. Be sure to make and go to all appointments, and call your doctor if you are having problems. It's also a good idea to know your test results and keep a list of the medicines you take. Where can you learn more? Go to http://brenda-javi.info/. Carmelita Ledesma in the search box to learn more about \"Plantar Fasciitis: Exercises. \" Current as of: September 20, 2018 Content Version: 11.9 © 9316-3377 [employer]. Care instructions adapted under license by [employer] (which disclaims liability or warranty for this information). If you have questions about a medical condition or this instruction, always ask your healthcare professional. [address] any warranty or liability for your use of this information. 4. Lie on your back with both knees bent. Your knees should be bent about 90 degrees. 5. Tighten your belly muscles by pulling in your belly button toward your spine. Then push your feet into the floor, squeeze your buttocks, and lift your hips off the floor until your shoulders, hips, and knees are all in a straight line. 6. Hold for about 6 seconds as you continue to breathe normally, and then slowly lower your hips back down to the floor and rest for up to 10 seconds. 7. Repeat 8 to 12 times. Hip extension 4. Get down on your hands and knees on the floor. 5. Keeping your back and neck straight, lift one leg straight out behind you. When you lift your leg, keep your hips level. Don't let your back twist, and don't let your hip drop toward the floor. 6. Hold for 6 seconds. Repeat 8 to 12 times with each leg. 7. If you feel steady and strong when you do this exercise, you can make it more difficult. To do this, when you lift your leg, also lift the opposite arm straight out in front of you. For example, lift the left leg and the right arm at the same time. (This is sometimes called the \"bird dog exercise. \") Hold for 6 seconds, and repeat 8 to 12 times on each side. Clamshell 3. Lie on your side with a pillow under your head. Keep your feet and knees together and your knees bent. 4. Raise your top knee, but keep your feet together. Do not let your hips roll back. Your legs should open up like a clamshell. 5. Hold for 6 seconds. 6. Slowly lower your knee back down. Rest for 10 seconds. 7. Repeat 8 to 12 times. 8. Switch to your other side and repeat steps 1 through 5. Hamstring wall stretch 3. Lie on your back in a doorway, with one leg through the open door. 4. Slide your affected leg up the wall to straighten your knee. You should feel a gentle stretch down the back of your leg. 1. Do not arch your back. 2. Do not bend either knee. 3. Keep one heel touching the floor and the other heel touching the wall. Do not point your toes. 5. Hold the stretch for at least 1 minute to begin. Then try to lengthen the time you hold the stretch to as long as 6 minutes. 6. Switch legs, and repeat steps 1 through 3. 
7. Repeat 2 to 4 times. 8. If you do not have a place to do this exercise in a doorway, there is another way to do it: 
9. Lie on your back, and bend one knee. 10. Loop a towel under the ball and toes of that foot, and hold the ends of the towel in your hands. 11. Straighten your knee, and slowly pull back on the towel. You should feel a gentle stretch down the back of your leg. 12. Switch legs, and repeat steps 1 through 3. 
13. Repeat 2 to 4 times. Lower abdominal strengthening 1. Lie on your back with your knees bent and your feet flat on the floor. 2. Tighten your belly muscles by pulling your belly button in toward your spine. 3. Lift one foot off the floor and bring your knee toward your chest, so that your knee is straight above your hip and your leg is bent like the letter \"L. \" 
4. Lift the other knee up to the same position. 5. Lower one leg at a time to the starting position. 6. Keep alternating legs until you have lifted each leg 8 to 12 times. 7. Be sure to keep your belly muscles tight and your back still as you are moving your legs. Be sure to breathe normally. Piriformis stretch 1. Lie on your back with your legs straight. 2. Lift your affected leg, and bend your knee. With your opposite hand, reach across your body, and then gently pull your knee toward your opposite shoulder. 3. Hold the stretch for 15 to 30 seconds. 4. Switch legs and repeat steps 1 through 3. 
5. Repeat 2 to 4 times. Follow-up care is a key part of your treatment and safety. Be sure to make and go to all appointments, and call your doctor if you are having problems.  It's also a good idea to know your test results and keep a list of the medicines you take. Where can you learn more? Go to http://brenda-javi.info/. Enter Y838 in the search box to learn more about \"Sacroiliac Pain: Exercises. \" Current as of: September 20, 2018 Content Version: 11.9 © 2896-0778 [employer]. Care instructions adapted under license by [employer] (which disclaims liability or warranty for this information). If you have questions about a medical condition or this instruction, always ask your healthcare professional. [address] any warranty or liability for your use of this information. Atopic Dermatitis: Care Instructions Your Care Instructions Atopic dermatitis (also called eczema) is a skin problem that causes intense itching and a red, raised rash. In severe cases, the rash develops clear fluidfilled blisters. The rash is not contagious. People with this condition seem to have very sensitive immune systems that are likely to react to things that cause allergies. The immune system is the body's way of fighting infection. There is no cure for atopic dermatitis, but you may be able to control it with care at home. Follow-up care is a key part of your treatment and safety. Be sure to make and go to all appointments, and call your doctor if you are having problems. It's also a good idea to know your test results and keep a list of the medicines you take. How can you care for yourself at home? · Use moisturizer at least twice a day. · If your doctor prescribes a cream, use it as directed. If your doctor prescribes other medicine, take it exactly as directed. · Wash the affected area with water only. Soap can make dryness and itching worse. Pat dry. · Apply a moisturizer after bathing. Use a cream such as Lubriderm, Moisturel, or Cetaphil that does not irritate the skin or cause a rash. Apply the cream while your skin is still damp after lightly drying with a towel. · Use cold, wet cloths to reduce itching. · Keep cool, and stay out of the sun. · If itching affects your normal activities, an over-the-counter antihistamine, such as diphenhydramine (Benadryl) or loratadine (Claritin) may help. Read and follow all instructions on the label. When should you call for help? Call your doctor now or seek immediate medical care if: 
  · Your rash gets worse and you have a fever.  
  · You have new blisters or bruises, or the rash spreads and looks like a sunburn.  
  · You have signs of infection, such as: 
? Increased pain, swelling, warmth, or redness. ? Red streaks leading from the rash. ? Pus draining from the rash. ? A fever.  
  · You have crusting or oozing sores.  
  · You have joint aches or body aches along with your rash.  
 Watch closely for changes in your health, and be sure to contact your doctor if: 
  · Your rash does not clear up after 2 to 3 weeks of home treatment.  
  · Itching interferes with your sleep or daily activities. Where can you learn more? Go to http://brendaSoxiablejavi.info/. Enter M579 in the search box to learn more about \"Atopic Dermatitis: Care Instructions. \" Current as of: April 17, 2018 Content Version: 11.9 © 0681-2029 [employer]. Care instructions adapted under license by [employer] (which disclaims liability or warranty for this information). If you have questions about a medical condition or this instruction, always ask your healthcare professional. [place] [address] any warranty or liability for your use of this information. Rash: Care Instructions Your Care Instructions A rash is any irritation or inflammation of the skin. Rashes have many possible causes, including allergy, infection, illness, heat, and emotional stress. Follow-up care is a key part of your treatment and safety.  Be sure to make and go to all appointments, and call your doctor if you are having problems. It's also a good idea to know your test results and keep a list of the medicines you take. How can you care for yourself at home? · Wash the area with water only. Soap can make dryness and itching worse. Pat dry. · Put cold, wet cloths on the rash to reduce itching. · Keep cool, and stay out of the sun. · Leave the rash open to the air as much of the time as possible. · Sometimes petroleum jelly (Vaseline) can help relieve the discomfort caused by a rash. A moisturizing lotion, such as Cetaphil, also may help. Calamine lotion may help for rashes caused by contact with something (such as a plant or soap) that irritated the skin. Use it 3 or 4 times a day. · If your doctor prescribed a cream, use it as directed. If your doctor prescribed medicine, take it exactly as directed. · If your rash itches so badly that it interferes with your normal activities, take an over-the-counter antihistamine, such as diphenhydramine (Benadryl) or loratadine (Claritin). Read and follow all instructions on the label. When should you call for help? Call your doctor now or seek immediate medical care if: 
  · You have signs of infection, such as: 
? Increased pain, swelling, warmth, or redness. ? Red streaks leading from the area. ? Pus draining from the area. ? A fever.  
  · You have joint pain along with the rash.  
 Watch closely for changes in your health, and be sure to contact your doctor if: 
  · Your rash is changing or getting worse. For example, call if you have pain along with the rash, the rash is spreading, or you have new blisters.  
  · You do not get better after 1 week. Where can you learn more? Go to http://brenda-javi.info/. Enter C097 in the search box to learn more about \"Rash: Care Instructions. \" Current as of: April 17, 2018 Content Version: 11.9 © 6505-3385 [employer]IC[employer], [employer].  Care instructions adapted under license by [address] (which disclaims liability or warranty for this information). If you have questions about a medical condition or this instruction, always ask your healthcare professional. [address] any warranty or liability for your use of this information.

## 2019-06-01 LAB
A ALTERNATA IGE QN: <0.1 KU/L
BERMUDA GRASS IGE QN: <0.1 KU/L
CAT DANDER IGE QN: <0.1 KU/L
CLAM IGE QN: <0.1 KU/L
CODFISH IGE QN: <0.1 KU/L
COMMON RAGWEED IGE QN: <0.1 KU/L
CORN IGE QN: <0.1 KU/L
COW MILK IGE QN: <0.1 KU/L
D FARINAE IGE QN: <0.1 KU/L
D PTERONYSS IGE QN: <0.1 KU/L
DOG DANDER IGE QN: <0.1 KU/L
EGG WHITE IGE QN: <0.1 KU/L
ENGL PLANTAIN IGE QN: <0.1 KU/L
KENT BLUE GRASS IGE QN: <0.1 KU/L
Lab: NORMAL
MOUSE URINE PROT IGE QN: <0.1 KU/L
PEANUT IGE QN: <0.1 KU/L
SCALLOP IGE QN: <0.1 KU/L
SESAME SEED IGE QN: <0.1 KU/L
SHRIMP IGE QN: <0.1 KU/L
SOYBEAN IGE QN: <0.1 KU/L
WALNUT IGE QN: <0.1 KU/L
WHEAT IGE QN: <0.1 KU/L
WHITE ELM IGE QN: <0.1 KU/L
WHITE OAK IGE QN: <0.1 KU/L

## 2020-12-24 ENCOUNTER — OFFICE VISIT (OUTPATIENT)
Dept: FAMILY MEDICINE CLINIC | Age: 55
End: 2020-12-24
Payer: COMMERCIAL

## 2020-12-24 VITALS
TEMPERATURE: 97.7 F | HEART RATE: 69 BPM | WEIGHT: 181.9 LBS | OXYGEN SATURATION: 97 % | DIASTOLIC BLOOD PRESSURE: 78 MMHG | BODY MASS INDEX: 28.55 KG/M2 | RESPIRATION RATE: 16 BRPM | SYSTOLIC BLOOD PRESSURE: 127 MMHG | HEIGHT: 67 IN

## 2020-12-24 DIAGNOSIS — Z78.0 LATE ONSET MENOPAUSE: ICD-10-CM

## 2020-12-24 DIAGNOSIS — Z23 ENCOUNTER FOR IMMUNIZATION: ICD-10-CM

## 2020-12-24 DIAGNOSIS — Z13.31 SCREENING FOR DEPRESSION: ICD-10-CM

## 2020-12-24 DIAGNOSIS — R53.83 LOW ENERGY: ICD-10-CM

## 2020-12-24 DIAGNOSIS — R53.83 OTHER FATIGUE: ICD-10-CM

## 2020-12-24 DIAGNOSIS — Z00.00 MEDICARE ANNUAL WELLNESS VISIT, SUBSEQUENT: Primary | ICD-10-CM

## 2020-12-24 DIAGNOSIS — R63.8 WEIGHT DISORDER: ICD-10-CM

## 2020-12-24 PROCEDURE — G0439 PPPS, SUBSEQ VISIT: HCPCS | Performed by: FAMILY MEDICINE

## 2020-12-24 PROCEDURE — 99213 OFFICE O/P EST LOW 20 MIN: CPT | Performed by: FAMILY MEDICINE

## 2020-12-24 RX ORDER — GLUCOSAMINE SULFATE 1500 MG
3000 POWDER IN PACKET (EA) ORAL DAILY
COMMUNITY

## 2020-12-24 RX ORDER — LANOLIN ALCOHOL/MO/W.PET/CERES
CREAM (GRAM) TOPICAL DAILY
COMMUNITY

## 2020-12-24 RX ORDER — ASCORBIC ACID 500 MG
500 TABLET ORAL DAILY
COMMUNITY

## 2020-12-24 RX ORDER — PHENTERMINE HYDROCHLORIDE 37.5 MG/1
37.5 TABLET ORAL
Qty: 30 TAB | Refills: 0 | Status: SHIPPED | OUTPATIENT
Start: 2020-12-24

## 2020-12-24 RX ORDER — ZOSTER VACCINE RECOMBINANT, ADJUVANTED 50 MCG/0.5
KIT INTRAMUSCULAR
Qty: 0.5 ML | Refills: 1 | Status: SHIPPED | OUTPATIENT
Start: 2020-12-24

## 2020-12-24 NOTE — PROGRESS NOTES
This is the Subsequent Medicare Annual Wellness Exam, performed 12 months or more after the Initial AWV or the last Subsequent AWV    I have reviewed the patient's medical history in detail and updated the computerized patient record. Depression Risk Factor Screening:     3 most recent PHQ Screens 5/28/2019   Little interest or pleasure in doing things Not at all   Feeling down, depressed, irritable, or hopeless Not at all   Total Score PHQ 2 0       Alcohol Risk Screen    Do you average more than 1 drink per night or more than 7 drinks a week:  No    On any one occasion in the past three months have you have had more than 3 drinks containing alcohol:  No        Functional Ability and Level of Safety:    Hearing: Hearing is good. Activities of Daily Living: The home contains: handrails, grab bars and rugs  Patient does total self care      Ambulation: with no difficulty     Fall Risk:  Fall Risk Assessment, last 12 mths 5/28/2019   Able to walk? Yes   Fall in past 12 months? No      Abuse Screen:  Patient is not abused       Cognitive Screening    Has your family/caregiver stated any concerns about your memory: no     Cognitive Screening: Normal - MMSE (Mini Mental Status Exam)    Assessment/Plan   Education and counseling provided:  Are appropriate based on today's review and evaluation  End-of-Life planning (with patient's consent)  Influenza Vaccine  Screening Mammography  Screening Pap and pelvic (covered once every 2 years)  Colorectal cancer screening tests  Medical nutrition therapy for individuals with diabetes or renal disease    Diagnoses and all orders for this visit:    1. Medicare annual wellness visit, subsequent    2. Screening for depression  -     DEPRESSION SCREEN ANNUAL    3.  Encounter for immunization  -     TETANUS, DIPHTHERIA TOXOIDS AND ACELLULAR PERTUSSIS VACCINE (TDAP), IN INDIVIDS. >=7, IM  -     varicella-zoster recombinant, PF, (Shingrix, PF,) 50 mcg/0.5 mL susr injection; 0.5mL by IntraMUSCular route once now and then repeat in 2-6 months    4. Low energy  -     phentermine (ADIPEX-P) 37.5 mg tablet; Take 1 Tab by mouth every morning. Max Daily Amount: 37.5 mg.    5. Other fatigue  -     phentermine (ADIPEX-P) 37.5 mg tablet; Take 1 Tab by mouth every morning. Max Daily Amount: 37.5 mg.    6. Weight disorder  -     phentermine (ADIPEX-P) 37.5 mg tablet; Take 1 Tab by mouth every morning. Max Daily Amount: 37.5 mg.    7. Late onset menopause        Health Maintenance Due     Health Maintenance Due   Topic Date Due    DTaP/Tdap/Td series (1 - Tdap) 12/27/1984    Shingrix Vaccine Age 50> (1 of 2) 12/27/2013    PAP AKA CERVICAL CYTOLOGY  05/07/2018    A1C test (Diabetic or Prediabetic)  06/28/2019    Flu Vaccine (1) 09/01/2020       Patient Care Team   Patient Care Team:  Louis Fierro MD as PCP - General (Family Medicine)  Louis Fierro MD as PCP - [facility] Empaneled Provider    History       Patient able to drive no recent accident, Patient compare self health the same to others with the same age,   patient with normal hearing normal vision able to do all ADL currently working full-time, having had no fall and no incontinence having normal appetite no weight loss since last seen eating fruits and vegetables every day does not do exercises denies any substance abuse,     Unfortunately patient has couple other problems and concerns which were not included in annual wellness exam visit,           Patient Active Problem List   Diagnosis Code    Hypercholesterolemia E78.00    Family history of diabetes mellitus Z83.3    Prediabetes R73.03    Onychomycosis B35.1     Past Medical History:   Diagnosis Date    Constipation     Family history of diabetes mellitus 5/6/2014    Hypercholesterolemia 5/6/2014    Onychomycosis 11/4/2015    Prediabetes 11/4/2015      History reviewed. No pertinent surgical history.   Current Outpatient Medications   Medication Sig Dispense Refill    cholecalciferol (Vitamin D3) 25 mcg (1,000 unit) cap Take 3,000 Units by mouth daily.  cyanocobalamin 1,000 mcg tablet Take  by mouth daily.  ascorbic acid, vitamin C, (Vitamin C) 500 mg tablet Take 500 mg by mouth daily.  elderberry fruit (ELDERBERRY PO) Take  by mouth. Syrup      varicella-zoster recombinant, PF, (Shingrix, PF,) 50 mcg/0.5 mL susr injection 0.5mL by IntraMUSCular route once now and then repeat in 2-6 months 0.5 mL 1    triamcinolone (ARISTOCORT) 0.5 % topical cream Apply  to affected area two (2) times a day. use thin layer 454 g 2    MULTIVITAMIN PO Take  by mouth daily. Allergies   Allergen Reactions    Latex Hives    Erythromycin Hives and Itching       Family History   Problem Relation Age of Onset    Diabetes Mother     Psychiatric Disorder Mother     Heart Disease Maternal Grandmother      Social History     Tobacco Use    Smoking status: Never Smoker    Smokeless tobacco: Never Used   Substance Use Topics    Alcohol use: Not Currently     Comment: Very ocassionally       AWV education and counseling provided:    Age appropriate evidence-based preventive care recommendations based on today's review and evaluation; including relevant cancer screening guidelines, and vaccination recommendations. Primary secondary and tertiary preventive measures were discussed with the patient with information about these guidelines, and a personalized schedule for health maintenance items. When appropriate and with patient agreement, orders noted were placed to complete missing health maintenance items.

## 2020-12-24 NOTE — PROGRESS NOTES
Name and  Verified. Chief Complaint   Patient presents with    Follow-up     Hypercholestrolemia       1. Have you been to the ER, urgent care clinic since your last visit? Hospitalized since your last visit? No      2. Have you seen or consulted any other health care providers outside of the [address] [place] [address] since your last visit? Include any pap smears or colon screening.         Yes    GYN  Routine Exam  Dr. Damián Epps

## 2020-12-24 NOTE — PROGRESS NOTES
HISTORY OF PRESENT ILLNESS  Jerica Toussaint is a 62 y.o. female. In addition patient has history of hypercholesterolemia has not been on any  statin therapy denies any muscle ache,   last lipid panel was reviewed total chol of 203,  has been trying to have a low-fat low-cholesterol diet sometimes likes the fast foods weight has been stable,  HDL Cholesterol 79   LDL, calculated 101     premenop  Started to become irregular 7 months ago, +++ hot flashes started couple months ago, with painful intercourse, has tried otc not helping   ++feeling anxious without insomnia with exercise, ++daily fatigue, + vaginal dryness, + dyspareunia, lmp 04/6/2020 irreg, no fhx of dvt no hx of breast cancer, no cigs no etoh abuse      Wt concerning      181 lb 14.4 oz (82.5 kg)  as of 12/24/2020 181 lb 14.4 oz (82.5 kg) 173 lb (78.5 kg) 176 lb 3.2 oz (79.9 kg) 171 lb 3.2 oz (77.7 kg) 162 lb 6.4 oz (73.7 kg) 161            Current Outpatient Medications   Medication Sig Dispense Refill    cholecalciferol (Vitamin D3) 25 mcg (1,000 unit) cap Take 3,000 Units by mouth daily.  cyanocobalamin 1,000 mcg tablet Take  by mouth daily.  ascorbic acid, vitamin C, (Vitamin C) 500 mg tablet Take 500 mg by mouth daily.  elderberry fruit (ELDERBERRY PO) Take  by mouth. Syrup      varicella-zoster recombinant, PF, (Shingrix, PF,) 50 mcg/0.5 mL susr injection 0.5mL by IntraMUSCular route once now and then repeat in 2-6 months 0.5 mL 1    triamcinolone (ARISTOCORT) 0.5 % topical cream Apply  to affected area two (2) times a day. use thin layer 454 g 2    MULTIVITAMIN PO Take  by mouth daily. Allergies   Allergen Reactions    Latex Hives    Erythromycin Hives and Itching     Past Medical History:   Diagnosis Date    Constipation     Family history of diabetes mellitus 5/6/2014    Hypercholesterolemia 5/6/2014    Onychomycosis 11/4/2015    Prediabetes 11/4/2015     History reviewed. No pertinent surgical history.   Family History   Problem Relation Age of Onset    Diabetes Mother     Psychiatric Disorder Mother     Heart Disease Maternal Grandmother      Social History     Tobacco Use    Smoking status: Never Smoker    Smokeless tobacco: Never Used   Substance Use Topics    Alcohol use: Not Currently     Comment: Very ocassionally      Lab Results   Component Value Date/Time    WBC 8.4 06/28/2018 09:55 AM    HGB 13.1 06/28/2018 09:55 AM    HCT 39.7 06/28/2018 09:55 AM    PLATELET 819 59/44/2620 09:55 AM    MCV 90 06/28/2018 09:55 AM     Lab Results   Component Value Date/Time    GFR est non-AA 75 06/28/2018 09:55 AM    GFR est AA 86 06/28/2018 09:55 AM    Creatinine 0.88 06/28/2018 09:55 AM    BUN 13 06/28/2018 09:55 AM    Sodium 140 06/28/2018 09:55 AM    Potassium 4.4 06/28/2018 09:55 AM    Chloride 103 06/28/2018 09:55 AM    CO2 24 06/28/2018 09:55 AM        Review of Systems   Constitutional: Negative for chills, fever and malaise/fatigue. HENT: Negative for nosebleeds. Eyes: Negative for pain. Respiratory: Negative for cough and wheezing. Cardiovascular: Negative for chest pain and leg swelling. Gastrointestinal: Negative for constipation, diarrhea and nausea. Genitourinary: Negative for frequency. Musculoskeletal: Negative for joint pain and myalgias. Skin: Negative for rash. Neurological: Negative for loss of consciousness and headaches. Endo/Heme/Allergies: Does not bruise/bleed easily. Psychiatric/Behavioral: Negative for depression. The patient is not nervous/anxious and does not have insomnia. All other systems reviewed and are negative. Physical Exam  Vitals signs and nursing note reviewed. Constitutional:       Appearance: She is well-developed. HENT:      Head: Normocephalic and atraumatic. Eyes:      Conjunctiva/sclera: Conjunctivae normal.   Neck:      Musculoskeletal: Normal range of motion and neck supple.    Cardiovascular:      Rate and Rhythm: Normal rate and regular rhythm. Heart sounds: Normal heart sounds. No murmur. Pulmonary:      Effort: Pulmonary effort is normal.      Breath sounds: Normal breath sounds. Abdominal:      General: Bowel sounds are normal. There is no distension. Palpations: Abdomen is soft. Musculoskeletal: Normal range of motion. Lymphadenopathy:      Cervical: No cervical adenopathy. Skin:     Findings: No erythema. Neurological:      Mental Status: She is alert and oriented to person, place, and time. Psychiatric:         Behavior: Behavior normal.         ASSESSMENT and PLAN  Diagnoses and all orders for this visit:    1. Medicare annual wellness visit, subsequent    2. Screening for depression  -     DEPRESSION SCREEN ANNUAL    3. Encounter for immunization  -     TETANUS, DIPHTHERIA TOXOIDS AND ACELLULAR PERTUSSIS VACCINE (TDAP), IN INDIVIDS. >=7, IM  -     varicella-zoster recombinant, PF, (Shingrix, PF,) 50 mcg/0.5 mL susr injection; 0.5mL by IntraMUSCular route once now and then repeat in 2-6 months    4. Low energy  -     phentermine (ADIPEX-P) 37.5 mg tablet; Take 1 Tab by mouth every morning. Max Daily Amount: 37.5 mg.    5. Other fatigue  -     phentermine (ADIPEX-P) 37.5 mg tablet; Take 1 Tab by mouth every morning. Max Daily Amount: 37.5 mg.    6. Weight disorder  -     phentermine (ADIPEX-P) 37.5 mg tablet; Take 1 Tab by mouth every morning.  Max Daily Amount: 37.5 mg.    7. Late onset menopause

## 2021-01-21 ENCOUNTER — OFFICE VISIT (OUTPATIENT)
Dept: FAMILY MEDICINE CLINIC | Age: 56
End: 2021-01-21
Payer: COMMERCIAL

## 2021-01-21 VITALS
DIASTOLIC BLOOD PRESSURE: 76 MMHG | BODY MASS INDEX: 27.09 KG/M2 | OXYGEN SATURATION: 97 % | SYSTOLIC BLOOD PRESSURE: 116 MMHG | TEMPERATURE: 97.1 F | HEIGHT: 67 IN | WEIGHT: 172.6 LBS | HEART RATE: 70 BPM | RESPIRATION RATE: 16 BRPM

## 2021-01-21 DIAGNOSIS — Z71.89 ADVICE GIVEN ABOUT COVID-19 VIRUS INFECTION: ICD-10-CM

## 2021-01-21 DIAGNOSIS — L30.9 DERMATITIS: Primary | ICD-10-CM

## 2021-01-21 PROCEDURE — 99213 OFFICE O/P EST LOW 20 MIN: CPT | Performed by: FAMILY MEDICINE

## 2021-01-21 RX ORDER — TRIAMCINOLONE ACETONIDE 5 MG/G
CREAM TOPICAL 2 TIMES DAILY
Qty: 454 G | Refills: 2 | Status: SHIPPED | OUTPATIENT
Start: 2021-01-21

## 2021-01-21 NOTE — PROGRESS NOTES
Name and  Verified. Chief Complaint   Patient presents with    Follow-up     4 weeks       1. Have you been to the ER, urgent care clinic since your last visit? Hospitalized since your last visit? No      2. Have you seen or consulted any other health care providers outside of the [address] [place] [address] since your last visit? Include any pap smears or colon screening.     No

## 2021-01-21 NOTE — PROGRESS NOTES
HISTORY OF PRESENT ILLNESS  Yamileth Hernandes is a 60 y.o. female. Obesity  The pt did not feel very good on this meds, feeling less tired and fatigued, becoming to be more concentrated at work and at home, getting along very well with family member and the work place,  the pt has becoming to be more active and having daily multiple healthy different diets,  Is more involved with the weekly routine exercises, not a fast fooder, states that the pt does not eat too much as used to do and the portion are very well controlled, does not likes it very much to continue on this medication        172 lb 9.6 oz (78.3 kg)  as of 1/21/2021 172 lb 9.6 oz (78.3 kg) 181 lb 14. Rash of the hands  Started few weeks ago not better tried alcohol washing and OTC antibiotic ointments, dosenot tingles and not pain full, states that is notexpanding red, and not  swelled up, whitish patches rounds, with itchiness      Current Outpatient Medications   Medication Sig Dispense Refill    cholecalciferol (Vitamin D3) 25 mcg (1,000 unit) cap Take 3,000 Units by mouth daily.  cyanocobalamin 1,000 mcg tablet Take  by mouth daily.  ascorbic acid, vitamin C, (Vitamin C) 500 mg tablet Take 500 mg by mouth daily.  triamcinolone (ARISTOCORT) 0.5 % topical cream Apply  to affected area two (2) times a day. use thin layer 454 g 2    MULTIVITAMIN PO Take  by mouth daily.  elderberry fruit (ELDERBERRY PO) Take  by mouth. Syrup      varicella-zoster recombinant, PF, (Shingrix, PF,) 50 mcg/0.5 mL susr injection 0.5mL by IntraMUSCular route once now and then repeat in 2-6 months 0.5 mL 1    phentermine (ADIPEX-P) 37.5 mg tablet Take 1 Tab by mouth every morning.  Max Daily Amount: 37.5 mg. 30 Tab 0     Allergies   Allergen Reactions    Latex Hives    Erythromycin Hives and Itching     Past Medical History:   Diagnosis Date    Constipation     Family history of diabetes mellitus 5/6/2014    Hypercholesterolemia 5/6/2014    Onychomycosis 11/4/2015    Prediabetes 11/4/2015     No past surgical history on file. Family History   Problem Relation Age of Onset    Diabetes Mother     Psychiatric Disorder Mother     Heart Disease Maternal Grandmother      Social History     Tobacco Use    Smoking status: Never Smoker    Smokeless tobacco: Never Used   Substance Use Topics    Alcohol use: Not Currently     Comment: Very ocassionally      Lab Results   Component Value Date/Time    Hemoglobin A1c 5.4 06/28/2018 09:55 AM    Hemoglobin A1c 5.8 (H) 11/04/2015 08:21 AM    Hemoglobin A1c 5.8 (H) 05/07/2015 09:20 AM    Glucose 85 06/28/2018 09:55 AM    LDL, calculated 101 (H) 06/28/2018 09:55 AM    Creatinine 0.88 06/28/2018 09:55 AM      Lab Results   Component Value Date/Time    GFR est non-AA 75 06/28/2018 09:55 AM    GFR est AA 86 06/28/2018 09:55 AM    Creatinine 0.88 06/28/2018 09:55 AM    BUN 13 06/28/2018 09:55 AM    Sodium 140 06/28/2018 09:55 AM    Potassium 4.4 06/28/2018 09:55 AM    Chloride 103 06/28/2018 09:55 AM    CO2 24 06/28/2018 09:55 AM        Review of Systems   Constitutional: Negative for chills and fever. HENT: Negative for congestion and nosebleeds. Eyes: Negative for blurred vision and pain. Respiratory: Negative for cough, shortness of breath and wheezing. Cardiovascular: Negative for chest pain and leg swelling. Gastrointestinal: Negative for constipation, diarrhea, nausea and vomiting. Genitourinary: Negative for dysuria and frequency. Musculoskeletal: Negative for joint pain and myalgias. Skin: Negative for itching and rash. Neurological: Negative for dizziness, loss of consciousness and headaches. Psychiatric/Behavioral: Negative for depression. The patient is not nervous/anxious and does not have insomnia. Physical Exam  Vitals signs and nursing note reviewed. Constitutional:       Appearance: She is well-developed. HENT:      Head: Normocephalic and atraumatic.    Eyes: Conjunctiva/sclera: Conjunctivae normal.   Neck:      Musculoskeletal: Normal range of motion and neck supple. Cardiovascular:      Rate and Rhythm: Normal rate and regular rhythm. Heart sounds: Normal heart sounds. No murmur. Pulmonary:      Effort: Pulmonary effort is normal.      Breath sounds: Normal breath sounds. Abdominal:      General: Bowel sounds are normal. There is no distension. Palpations: Abdomen is soft. Musculoskeletal: Normal range of motion. Lymphadenopathy:      Cervical: No cervical adenopathy. Skin:     Findings: No erythema. Neurological:      Mental Status: She is alert and oriented to person, place, and time. Psychiatric:         Behavior: Behavior normal.         ASSESSMENT and PLAN  Diagnoses and all orders for this visit:    1. Dermatitis  -     triamcinolone (ARISTOCORT) 0.5 % topical cream; Apply  to affected area two (2) times a day.  use thin layer         Hydration w/ skin emolients, po otc antihistamine for relief of itching, reduce exposures to house dust mites, try to identify allergic triggers   Concern dave PRUITTID-19 addressed and detailed, pt was told that the best way to prevent illness is by protection, to Wear a facemask , having social distance, and to get tested if possible,   Pt was also told if develop dyspnea needs to call 911 or go to er, call for morteza advise, pt agreed with todays recommendations,

## 2021-07-24 NOTE — PATIENT INSTRUCTIONS
Medicare Wellness Visit, Female The best way to live healthy is to have a lifestyle where you eat a well-balanced diet, exercise regularly, limit alcohol use, and quit all forms of tobacco/nicotine, if applicable. Regular preventive services are another way to keep healthy. Preventive services (vaccines, screening tests, monitoring & exams) can help personalize your care plan, which helps you manage your own care. Screening tests can find health problems at the earliest stages, when they are easiest to treat. [facility] follows the current, evidence-based guidelines published by the [facility] ([facility]) when recommending preventive services for our patients. Because we follow these guidelines, sometimes recommendations change over time as research supports it. (For example, mammograms used to be recommended annually. Even though Medicare will still pay for an annual mammogram, the newer guidelines recommend a mammogram every two years for women of average risk). Of course, you and your doctor may decide to screen more often for some diseases, based on your risk and your co-morbidities (chronic disease you are already diagnosed with). Preventive services for you include: - Medicare offers their members a free annual wellness visit, which is time for you and your primary care provider to discuss and plan for your preventive service needs. Take advantage of this benefit every year! 
-All adults over the age of 72 should receive the recommended pneumonia vaccines. Current USPSTF guidelines recommend a series of two vaccines for the best pneumonia protection.  
-All adults should have a flu vaccine yearly and a tetanus vaccine every 10 years.  
-All adults age 48 and older should receive the shingles vaccines (series of two vaccines). -All adults age 38-68 who are overweight should have a diabetes screening test once every three years. OFFICE VISIT      Patient: Bessie Moreno Date of Service: 2021   : 1990 MRN: [id number]     SUBJECTIVE:     Chief Complaint   Patient presents with   • URI   • Office Visit         HISTORY OF PRESENT ILLNESS:  Bessie Moreno is a 31 year old female who presents today for nasal discharge, sore throat, cough, body aches for 3 days. Sore throat, runny nose and body aches resolved, but productive cough and wheezing remain.  Patient took Mucinex Day and Night with temporary relief.        PAST MEDICAL HISTORY:  Past Medical History:   Diagnosis Date   • Pre-eclampsia affecting pregnancy, antepartum    • RAD (reactive airway disease)        MEDICATIONS:  Current Outpatient Medications   Medication Sig   • Prenatal w/o A Vit-Fe Fum-FA (PRENATA PO)    • albuterol 108 (90 Base) MCG/ACT inhaler Inhale 2 puffs into the lungs every 4 hours as needed for Shortness of Breath or Wheezing.   • triamcinolone (ARISTOCORT) 0.1 % cream Apply topically 2 times daily.   • clotrimazole (LOTRIMIN) 1 % cream Apply to affected area twice daily   • etonogestrel-ethinyl estradiol (NUVARING) 0.12-0.015 MG/24HR vaginal ring Place 1 each vaginally as directed.   • amoxicillin (AMOXIL) 500 MG capsule Take 1 capsule by mouth 3 times daily.   • mupirocin (BACTROBAN) 2 % ointment Apply topically 3 times daily.     No current facility-administered medications for this visit.       ALLERGIES:  ALLERGIES:  No Known Allergies    PAST SURGICAL HISTORY:  Past Surgical History:   Procedure Laterality Date   • Pilonidal cyst drainage         FAMILY HISTORY:  History reviewed. No pertinent family history.    SOCIAL HISTORY:  Social History     Tobacco Use   • Smoking status: Never Smoker   • Smokeless tobacco: Never Used   Substance Use Topics   • Alcohol use: Yes     Comment: social   • Drug use: Never       Review of Systems   Constitutional:        Had chills and body aches, better today   HENT:        Had nasal discharge,  -All adults born between [birth year] and 1965 should be screened once for Hepatitis C. 
-Other screening tests and preventive services for persons with diabetes include: an eye exam to screen for diabetic retinopathy, a kidney function test, a foot exam, and stricter control over your cholesterol.  
-Cardiovascular screening for adults with routine risk involves an electrocardiogram (ECG) at intervals determined by your doctor.  
-Colorectal cancer screenings should be done for adults age 54-65 with no increased risk factors for colorectal cancer. There are a number of acceptable methods of screening for this type of cancer. Each test has its own benefits and drawbacks. Discuss with your doctor what is most appropriate for you during your annual wellness visit. The different tests include: colonoscopy (considered the best screening method), a fecal occult blood test, a fecal DNA test, and sigmoidoscopy. 
 
-A bone mass density test is recommended when a woman turns 65 to screen for osteoporosis. This test is only recommended one time, as a screening. Some providers will use this same test as a disease monitoring tool if you already have osteoporosis. -Breast cancer screenings are recommended every other year for women of normal risk, age 54-69. 
-Cervical cancer screenings for women over age 72 are only recommended with certain risk factors. Here is a list of your current Health Maintenance items (your personalized list of preventive services) with a due date: 
Health Maintenance Due Topic Date Due  
 DTaP/Tdap/Td  (1 - Tdap) 12/27/1984  Shingles Vaccine (1 of 2) 12/27/2013  Pap Test  05/07/2018  Hemoglobin A1C    06/28/2019  Yearly Flu Vaccine (1) 09/01/2020 Learning About Breast Cancer Screening What is breast cancer screening? congestion and sore throat, better today   Eyes: Negative.    Respiratory: Positive for cough and wheezing.         Productive cough with thick mucous.   Cardiovascular: Negative.    Gastrointestinal: Negative.          OBJECTIVE:     Physical Exam  Constitutional:       Appearance: Normal appearance.   HENT:      Right Ear: Tympanic membrane normal.      Left Ear: Tympanic membrane normal.      Nose: No congestion or rhinorrhea.      Mouth/Throat:      Mouth: Mucous membranes are moist.      Pharynx: Oropharynx is clear. No oropharyngeal exudate or posterior oropharyngeal erythema.   Eyes:      Conjunctiva/sclera: Conjunctivae normal.   Cardiovascular:      Rate and Rhythm: Tachycardia present.   Pulmonary:      Breath sounds: Decreased breath sounds and wheezing present.         Visit Vitals  /79 (BP Location: LUE - Left upper extremity, Patient Position: Sitting, Cuff Size: Regular)   Pulse (!) 110   Temp 99.2 °F (37.3 °C) (Temporal)   Resp 16   Ht 5' 7\" (1.702 m)   Wt 91 kg (200 lb 9.9 oz)   LMP 06/22/2021 (Exact Date)   SpO2 96%   BMI 31.42 kg/m²       DIAGNOSTIC STUDIES:   LAB RESULTS:        Walk In on 07/24/2021   Component Date Value Ref Range Status   • POCT SARS-COV-2 ANTIGEN 07/24/2021 Not Detected  Not Detected Final       Assessment AND PLAN:   This is a 31 year old year-old female who presents with   Chief Complaint   Patient presents with   • URI   • Office Visit   .    Encounter Diagnoses   Name Primary?   • Acute viral bronchitis Yes   • Pregnancy as incidental finding             Summary of your Discharge Medications          Accurate as of July 24, 2021  1:44 PM. Always use your most recent med list.            Take these Medications      Details   albuterol 108 (90 Base) MCG/ACT inhaler   Inhale 2 puffs into the lungs every 4 hours as needed for Shortness of Breath or Wheezing.     amoxicillin 500 MG capsule  Commonly known as: AMOXIL   Take 1 capsule by mouth 3 times daily.      clotrimazole 1 % cream  Commonly known as: LOTRIMIN   Apply to affected area twice daily     mupirocin 2 % ointment  Commonly known as: Bactroban   Apply topically 3 times daily.     NuvaRing 0.12-0.015 MG/24HR vaginal ring   Generic drug: etonogestrel-ethinyl estradiol  Place 1 each vaginally as directed.     PRENATA PO      triamcinolone 0.1 % cream  Commonly known as: ARISTOCORT   Apply topically 2 times daily.            Return if symptoms worsen or fail to improve.    Instructions provided as documented in the AVS.          The Patient indicated understanding of the diagnosis and agreed with the plan of care.        KATIA Coronel     Breast cancer occurs when cells that are not normal grow in one or both of your breasts. Screening tests can help find breast cancer early. Cancer is easier to treat when it's found early. Having concerns about breast cancer is common. That's why it's important to talk with your doctor about when to start and how often to get screened for breast cancer. How is breast cancer screening done? Several screening tests can be used to check for breast cancer. · Mammograms check for signs of cancer using X-rays. They can show tumors that are too small for you or your doctor to feel. During a mammogram, a machine squeezes your breasts to make them flatter and easier to X-ray. At least two pictures are taken of each breast. One is taken from the top and one from the side. · 3-D mammograms are also called digital breast tomosynthesis. Your breast is positioned on a flat plate. A top plate is pressed against your breast to keep it in position. The X-ray arm then moves in an arc above the breast and takes many pictures. A computer uses these X-rays to create a three-dimensional image. · Clinical breast exams are a doctor's exam. Your doctor carefully feels your breasts and under your arms to check for lumps or other changes. After the screening, your doctor will tell you the results. You will also be told if you need any follow-up tests. When should you get screened? Talk with your doctor about when you should start being tested for breast cancer. How often you get tested and the kind of tests you get will depend on your age and your risk. The guidelines that follow are for women who have an average risk for breast cancer. If you have a higher risk for breast cancer, such as having a family history of breast cancer in multiple relatives or at a young age, your doctor may recommend different screening for you. · Ages 21 to 44: Some experts recommend that women have a clinical breast exam every 3 years, starting at age 21. Ask your doctor how often you should have this test. If you have a high risk for breast cancer, talk with your doctor about when to start yearly mammograms and other screening tests. · Ages 36 and older: Talk with your doctor about how often you should have mammograms and clinical breast exams. What is your risk for breast cancer? If you don't already know your risk of breast cancer, you can ask your doctor about it. You can also look it up at www.cancer.gov/bcrisktool/. If your doctor says that you have a high or very high risk, ask about ways to reduce your risk. These could include getting extra screening, taking medicine, or having surgery. If you have a strong family history of breast cancer, ask your doctor about genetic testing. What steps can you take to stay healthy? Some things that increase your risk of breast cancer, such as your age and being female, cannot be controlled. But you can do some things to stay as healthy as you can. · Learn what your breasts normally look and feel like. If you notice any changes, tell your doctor. · Drink alcohol wisely. Your risk goes up the more you drink. For the best health, women should have no more than 1 drink a day or 7 drinks a week. · If you smoke, quit. When you quit smoking, you lower your chances of getting many types of cancer. You can also do your best to eat well, be active, and stay at a healthy weight. Eating healthy foods and being active every day, as well as staying at a healthy weight, may help prevent cancer. Where can you learn more? Go to http://www.gray.com/. Enter E863 in the search box to learn more about \"Learning About Breast Cancer Screening. \" Current as of: March 28, 2018 Content Version: 11.8 © 2702-0681 [employer]. Care instructions adapted under license by [facility] (which disclaims liability or warranty for this information). If you have questions about a medical condition or this instruction, always ask your healthcare professional. [address] any warranty or liability for your use of this information. Learning About Cervical Cancer Screening What is a cervical cancer screening test? 
  
The cervix is the lower part of the uterus that opens into the vagina. Cervical cancer screening tests check the cells on the cervix for changes that could lead to cancer. Two tests can be used to screen for cervical cancer. They may be used alone or together. A Pap test.  
This test looks for changes in the cells of the cervix. Some of these cell changes could lead to cancer. A human papillomavirus (HPV) test.  
This test looks for the HPV virus. Some high-risk types of HPV can cause cell changes that could lead to cervical cancer. The HPV test may be used with the Pap test in women ages 27 and older. When should you have a screening test? 
If you have a cervix and are at average risk for cervical cancer, your doctor will likely suggest starting screening at age 24. Ages 24 to 34 If you're in this age group, your doctor will likely suggest that you get a Pap test. If your Pap test results are normal, you can wait 3 years to have another test. 
Ages 27 to 59 Screening options for this age group may include: · A Pap test. If your results are normal, you can wait 3 years to have another test. 
· An HPV test. If your results are normal, you can wait 5 years to have another test. 
· A Pap test and an HPV test. If your results are normal, you can wait 5 years to be tested again. Ages 72 and older If you are age 72 or older, you may no longer need this screening test. Talk to your doctor. What do the results of cervical cancer screening mean? Your test results may be normal. Or the results may show minor or serious changes to the cells on your cervix. Minor changes may go away on their own, especially if you are younger than 30. You may have an abnormal test because you have an infection of the vagina or cervix or because you have low estrogen levels after menopause that are causing the cells to change. If you have a high-risk type of human papillomavirus (HPV) or cell changes that could turn into cancer, you may need more tests. The next step may be a colposcopy or treatment to remove or destroy the abnormal cells. If you have a Pap test, an HPV test, or both, your doctor will recommend a follow-up plan based on your results and your age. Follow-up care is a key part of your treatment and safety. Be sure to make and go to all appointments, and call your doctor if you are having problems. It's also a good idea to know your test results and keep a list of the medicines you take. Where can you learn more? Go to http://www.sierra.com/ Enter P919 in the search box to learn more about \"Learning About Cervical Cancer Screening. \" Current as of: April 29, 2020               Content Version: 12.6 © 1580-5924 [employer]. Care instructions adapted under license by [employer] (which disclaims liability or warranty for this information). If you have questions about a medical condition or this instruction, always ask your healthcare professional. [place] [address] any warranty or liability for your use of this information. Colon Cancer Screening: Care Instructions Your Care Instructions Colorectal cancer occurs in the colon or rectum. That's the lower part of your digestive system. It is the second-leading cause of cancer deaths in the [place]. It often starts with small growths called polyps in the colon or rectum. Polyps are usually found with screening tests. Depending on the type of test, any polyps found may be removed during the tests. Colorectal cancer usually does not cause symptoms at first. But regular tests can help find it early, before it spreads and becomes harder to treat. Experts advise routine tests for colon cancer for people starting at age 48. And they advise people with a higher risk of colon cancer to get tested sooner. Talk with your doctor about when you should start testing. Discuss which tests you need. Follow-up care is a key part of your treatment and safety. Be sure to make and go to all appointments, and call your doctor if you are having problems. It's also a good idea to know your test results and keep a list of the medicines you take. What are the main screening tests for colon cancer? · Stool tests. These include the fecal immunochemical test (FIT) and the fecal occult blood test (FOBT). These tests check stool samples for signs of cancer. If your test is positive, you will need to have a colonoscopy. · Sigmoidoscopy. This test lets your doctor look at the lining of your rectum and the lowest part of your colon. Your doctor uses a lighted tube called a sigmoidoscope. This test can't find cancers or polyps in the upper part of your colon. In some cases, polyps that are found can be removed. But if your doctor finds polyps, you will need to have a colonoscopy to check the upper part of your colon. · Colonoscopy. This test lets your doctor look at the lining of your rectum and your entire colon. The doctor uses a thin, flexible tool called a colonoscope. It can also be used to remove polyps or get a tissue sample (biopsy). What tests do you need? The following guidelines are for people age 48 and over who are not at high risk for colorectal cancer. You may have at least one of these tests as directed by your doctor. · Fecal immunochemical test (FIT) or fecal occult blood test (FOBT) every year · Sigmoidoscopy every 5 years · Colonoscopy every 10 years If you are age 68 to 80, you can work with your doctor to decide if screening is a good option. If you are age 80 or older, your doctor will likely advise that screening is not helpful. Talk with your doctor about when you need to be tested. And discuss which tests are right for you. Your doctor may recommend earlier or more frequent testing if you: 
· Have had colorectal cancer before. · Have had colon polyps. · Have symptoms of colorectal cancer. These include blood in your stool and changes in your bowel habits. · Have a parent, brother or sister, or child with colon polyps or colorectal cancer. · Have a bowel disease. This includes ulcerative colitis and Crohn's disease. · Have a rare polyp syndrome that runs in families, such as familial adenomatous polyposis (FAP). · Have had radiation treatments to the belly or pelvis. When should you call for help? Watch closely for changes in your health, and be sure to contact your doctor if: 
  · You have any changes in your bowel habits.  
  · You have any problems. Where can you learn more? Go to http://www.gray.com/. Enter M541 in the search box to learn more about \"Colon Cancer Screening: Care Instructions. \" Current as of: March 28, 2018 Content Version: 11.8 © 3736-2874 [employer]. Care instructions adapted under license by [employer] (which disclaims liability or warranty for this information). If you have questions about a medical condition or this instruction, always ask your healthcare professional. [address] any warranty or liability for your use of this information.

## 2021-11-09 ENCOUNTER — OFFICE VISIT (OUTPATIENT)
Dept: FAMILY MEDICINE CLINIC | Age: 56
End: 2021-11-09
Payer: COMMERCIAL

## 2021-11-09 VITALS
WEIGHT: 178.5 LBS | SYSTOLIC BLOOD PRESSURE: 122 MMHG | BODY MASS INDEX: 28.02 KG/M2 | HEIGHT: 67 IN | TEMPERATURE: 99.3 F | RESPIRATION RATE: 16 BRPM | HEART RATE: 73 BPM | OXYGEN SATURATION: 97 % | DIASTOLIC BLOOD PRESSURE: 84 MMHG

## 2021-11-09 DIAGNOSIS — Z83.3 FAMILY HISTORY OF DIABETES MELLITUS: ICD-10-CM

## 2021-11-09 DIAGNOSIS — R73.03 PREDIABETES: Primary | ICD-10-CM

## 2021-11-09 DIAGNOSIS — Z71.89 ADVICE GIVEN ABOUT COVID-19 VIRUS INFECTION: ICD-10-CM

## 2021-11-09 DIAGNOSIS — E78.00 HYPERCHOLESTEROLEMIA: ICD-10-CM

## 2021-11-09 PROCEDURE — 99213 OFFICE O/P EST LOW 20 MIN: CPT | Performed by: FAMILY MEDICINE

## 2021-11-09 NOTE — PROGRESS NOTES
HISTORY OF PRESENT ILLNESS  Jaylon Duran is a 60 y.o. female,  Today's Covid unvaccinated Pt main concerns were provided in the clinic,    pt is w/ comorbid history and   Pt has been trying to wear mask most of the times,  pt has no fever no cough no dyspnea, no ha, not dizzy, nl smell nl taste, no N/V/D, has no orbital pain,  no body ache. Last mammogram, and pap was in 2020, colonoscopy uptodate vacc uptodate,     In addition patient has history of hypercholesterolemia has been compliant with the diet therapy denies any muscle ache, currently taking the medication nightly last lipid panel was reviewed and was normal     has been trying to have a low-fat low-cholesterol diet sometimes likes the fast foods weight has been stable    post menopausal  lights hot flashes  having less vaginal dryness,nl sleep and less fatigue with improved dyspareunia,    Current Outpatient Medications   Medication Sig Dispense Refill    triamcinolone (ARISTOCORT) 0.5 % topical cream Apply  to affected area two (2) times a day. use thin layer 454 g 2    cholecalciferol (Vitamin D3) 25 mcg (1,000 unit) cap Take 3,000 Units by mouth daily.  cyanocobalamin 1,000 mcg tablet Take  by mouth daily.  ascorbic acid, vitamin C, (Vitamin C) 500 mg tablet Take 500 mg by mouth daily.  elderberry fruit (ELDERBERRY PO) Take  by mouth. Syrup      MULTIVITAMIN PO Take  by mouth daily.  varicella-zoster recombinant, PF, (Shingrix, PF,) 50 mcg/0.5 mL susr injection 0.5mL by IntraMUSCular route once now and then repeat in 2-6 months (Patient not taking: Reported on 11/9/2021) 0.5 mL 1    phentermine (ADIPEX-P) 37.5 mg tablet Take 1 Tab by mouth every morning.  Max Daily Amount: 37.5 mg. (Patient not taking: Reported on 11/9/2021) 30 Tab 0     Allergies   Allergen Reactions    Latex Hives    Erythromycin Hives and Itching     Past Medical History:   Diagnosis Date    Constipation     Family history of diabetes mellitus 5/6/2014    Hypercholesterolemia 5/6/2014    Onychomycosis 11/4/2015    Prediabetes 11/4/2015     No past surgical history on file. Family History   Problem Relation Age of Onset    Diabetes Mother     Psychiatric Disorder Mother     Heart Disease Maternal Grandmother      Social History     Tobacco Use    Smoking status: Never Smoker    Smokeless tobacco: Never Used   Substance Use Topics    Alcohol use: Not Currently     Comment: Very ocassionally      Lab Results   Component Value Date/Time    Cholesterol, total 189 06/28/2018 09:55 AM    HDL Cholesterol 79 06/28/2018 09:55 AM    LDL, calculated 101 (H) 06/28/2018 09:55 AM    Triglyceride 43 06/28/2018 09:55 AM     Lab Results   Component Value Date/Time    ALT (SGPT) 22 06/28/2018 09:55 AM    Alk. phosphatase 82 06/28/2018 09:55 AM    Bilirubin, total 0.3 06/28/2018 09:55 AM    Albumin 4.0 06/28/2018 09:55 AM    Protein, total 7.1 06/28/2018 09:55 AM    PLATELET 735 76/60/7656 09:55 AM        Review of Systems   Constitutional: Negative for chills and fever. HENT: Negative for congestion and nosebleeds. Eyes: Negative for blurred vision and pain. Respiratory: Negative for cough, shortness of breath and wheezing. Cardiovascular: Negative for chest pain and leg swelling. Gastrointestinal: Negative for constipation, diarrhea, nausea and vomiting. Genitourinary: Negative for dysuria and frequency. Musculoskeletal: Negative for joint pain and myalgias. Skin: Negative for itching and rash. Neurological: Negative for dizziness, loss of consciousness and headaches. Psychiatric/Behavioral: Negative for depression. The patient is not nervous/anxious and does not have insomnia. Physical Exam  Vitals and nursing note reviewed. Constitutional:       Appearance: She is well-developed. HENT:      Head: Normocephalic and atraumatic.    Eyes:      Conjunctiva/sclera: Conjunctivae normal.      Pupils: Pupils are equal, round, and reactive to light. Neck:      Thyroid: No thyromegaly. Vascular: No JVD. Cardiovascular:      Rate and Rhythm: Normal rate and regular rhythm. Heart sounds: Normal heart sounds. No murmur heard. No friction rub. No gallop. Pulmonary:      Effort: Pulmonary effort is normal. No respiratory distress. Breath sounds: Normal breath sounds. No stridor. No wheezing or rales. Abdominal:      General: Bowel sounds are normal. There is no distension. Palpations: Abdomen is soft. There is no mass. Tenderness: There is no abdominal tenderness. Musculoskeletal:         General: No tenderness. Normal range of motion. Lymphadenopathy:      Cervical: No cervical adenopathy. Skin:     Findings: No erythema or rash. Neurological:      Mental Status: She is alert and oriented to person, place, and time. Cranial Nerves: No cranial nerve deficit. Deep Tendon Reflexes: Reflexes are normal and symmetric. Psychiatric:         Behavior: Behavior normal.         ASSESSMENT and PLAN  Diagnoses and all orders for this visit:    1. Prediabetes  -     CBC WITH AUTOMATED DIFF; Future  -     LIPID PANEL; Future  -     HEMOGLOBIN A1C WITH EAG; Future  -     METABOLIC PANEL, COMPREHENSIVE; Future  -     URINALYSIS W/ RFLX MICROSCOPIC; Future  -     TSH 3RD GENERATION; Future    2. Hypercholesterolemia  -     CBC WITH AUTOMATED DIFF; Future  -     LIPID PANEL; Future  -     HEMOGLOBIN A1C WITH EAG; Future  -     METABOLIC PANEL, COMPREHENSIVE; Future  -     URINALYSIS W/ RFLX MICROSCOPIC; Future  -     TSH 3RD GENERATION; Future    3. Family history of diabetes mellitus  -     CBC WITH AUTOMATED DIFF; Future  -     LIPID PANEL; Future  -     HEMOGLOBIN A1C WITH EAG; Future  -     METABOLIC PANEL, COMPREHENSIVE; Future  -     URINALYSIS W/ RFLX MICROSCOPIC; Future  -     TSH 3RD GENERATION; Future    4.  Advice given about COVID-19 virus infection    Other orders  -     SPECIMEN STATUS REPORT     Concern abdout COVID-19 addressed and detailed, pt was told that the best way to prevent illness is by protection with vaccination, and to Wear a facemask , having social distance, and to get tested if possible,   Pt was also told if develop dyspnea needs to call 911 or go to er, call for furhter advise, pt agreed with todays recommendations,

## 2021-11-09 NOTE — PATIENT INSTRUCTIONS
Learning About Coronavirus [phone])  Coronavirus [phone]): Overview  What is coronavirus (COVID-19)? The coronavirus disease (COVID-19) is caused by a virus. It is an illness that was first found in [place], [place], in December 2019. It has since spread worldwide. The virus can cause fever, cough, and trouble breathing. In severe cases, it can cause pneumonia and make it hard to breathe without help. It can cause death. Coronaviruses are a large group of viruses. They cause the common cold. They also cause more serious illnesses like Middle East respiratory syndrome (MERS) and severe acute respiratory syndrome (SARS). COVID-19 is caused by a novel coronavirus. That means it's a new type that has not been seen in people before. This virus spreads person-to-person through droplets from coughing and sneezing. It can also spread when you are close to someone who is infected. And it can spread when you touch something that has the virus on it, such as a doorknob or a tabletop. What can you do to protect yourself from coronavirus (COVID-19)? The best way to protect yourself from getting sick is to:  · Avoid areas where there is an outbreak. · Avoid contact with people who may be infected. · Wash your hands often with soap or alcohol-based hand sanitizers. · Avoid crowds and try to stay at least 6 feet away from other people. · Wash your hands often, especially after you cough or sneeze. Use soap and water, and scrub for at least 20 seconds. If soap and water aren't available, use an alcohol-based hand . · Avoid touching your mouth, nose, and eyes. What can you do to avoid spreading the virus to others? To help avoid spreading the virus to others:  · Cover your mouth with a tissue when you cough or sneeze. Then throw the tissue in the trash. · Use a disinfectant to clean things that you touch often. · Stay home if you are sick or have been exposed to the virus.  Don't go to school, work, or public areas. And don't use public transportation. · If you are sick:  ? Leave your home only if you need to get medical care. But call the doctor's office first so they know you're coming. And wear a face mask, if you have one.  ? If you have a face mask, wear it whenever you're around other people. It can help stop the spread of the virus when you cough or sneeze. ? Clean and disinfect your home every day. Use household  and disinfectant wipes or sprays. Take special care to clean things that you grab with your hands. These include doorknobs, remote controls, phones, and handles on your refrigerator and microwave. And don't forget countertops, tabletops, bathrooms, and computer keyboards. When to call for help  Call 911 anytime you think you may need emergency care. For example, call if:  · You have severe trouble breathing. (You can't talk at all.)  · You have constant chest pain or pressure. · You are severely dizzy or lightheaded. · You are confused or can't think clearly. · Your face and lips have a blue color. · You pass out (lose consciousness) or are very hard to wake up. Call your doctor now if you develop symptoms such as:  · Shortness of breath. · Fever. · Cough. If you need to get care, call ahead to the doctor's office for instructions before you go. Make sure you wear a face mask, if you have one, to prevent exposing other people to the virus. Where can you get the latest information? The following health organizations are tracking and studying this virus. Their websites contain the most up-to-date information. Hailey Garber also learn what to do if you think you may have been exposed to the virus. · U.S. Centers for Disease Control and Prevention (CDC): The CDC provides updated news about the disease and travel advice. The website also tells you how to prevent the spread of infection. www.cdc.gov  · World Health Organization [facility]): WHO offers information about the virus outbreaks.  WHO also has travel advice. www.who.int  Current as of: April 1, 2020               Content Version: 12.4  © 2006-2020 Healthwise, Incorporated. Care instructions adapted under license by your healthcare professional. If you have questions about a medical condition or this instruction, always ask your healthcare professional. [address] any warranty or liability for your use of this information. High Cholesterol: Care Instructions  Overview     Cholesterol is a type of fat in your blood. It is needed for many body functions, such as making new cells. Cholesterol is made by your body. It also comes from food you eat. High cholesterol means that you have too much of the fat in your blood. This raises your risk of a heart attack and stroke. LDL and HDL are part of your total cholesterol. LDL is the \"bad\" cholesterol. High LDL can raise your risk for coronary artery disease, heart attack, and stroke. HDL is the \"good\" cholesterol. It helps clear bad cholesterol from the body. High HDL is linked with a lower risk of coronary artery disease, heart attack, and stroke. Your cholesterol levels help your doctor find out your risk for having a heart attack or stroke. You and your doctor can talk about whether you need to lower your risk and what treatment is best for you. Treatment options include a heart-healthy lifestyle and medicine. Both options can help lower your cholesterol and your risk. The way you choose to lower your risk will depend on how high your risk is for heart attack and stroke. It will also depend on how you feel about taking medicines. Follow-up care is a key part of your treatment and safety. Be sure to make and go to all appointments, and call your doctor if you are having problems. It's also a good idea to know your test results and keep a list of the medicines you take. How can you care for yourself at home? · Eat heart-healthy foods.   ? Eat fruits, vegetables, whole grains, beans, and other high-fiber foods. ? Eat lean proteins, such as seafood, lean meats, beans, nuts, and soy products. ? Eat healthy fats, such as canola and olive oil. ? Choose foods that are low in saturated fat. ? Limit sodium and alcohol. ? Limit drinks and foods with added sugar. · Be physically active. Try to do moderate activity at least 2½ hours a week. Or try to do vigorous activity at least 1¼ hours a week. You may want to walk or try other activities, such as running, swimming, cycling, or playing tennis or team sports. · Stay at a healthy weight or lose weight by making the changes in eating and physical activity listed above. Losing just a small amount of weight, even 5 to 10 pounds, can help reduce your risk for having a heart attack or stroke. · Do not smoke. · Manage other health problems. These include diabetes and high blood pressure. If you think you may have a problem with alcohol or drug use, talk to your doctor. · If you take medicine, take it exactly as prescribed. Call your doctor if you think you are having a problem with your medicine. · Check with your doctor or pharmacist before you use any other medicines, including over-the-counter medicines. Make sure your doctor knows all of the medicines, vitamins, herbal products, and supplements you take. Taking some medicines together can cause problems. When should you call for help? Watch closely for changes in your health, and be sure to contact your doctor if:    · You need help making lifestyle changes.     · You have questions about your medicine. Where can you learn more? Go to http://www.gray.com/  Enter L846 in the search box to learn more about \"High Cholesterol: Care Instructions. \"  Current as of: April 29, 2021               Content Version: 13.0  © 9375-0558 Healthwise, [employer].    Care instructions adapted under license by [employer] (which disclaims liability or warranty for this information). If you have questions about a medical condition or this instruction, always ask your healthcare professional. [place] [address] any warranty or liability for your use of this information.

## 2021-11-09 NOTE — PROGRESS NOTES
Name and  Verified. Pharmacy verified    Chief Complaint   Patient presents with    Physical     Patient requesting labs         1. Have you been to the ER, urgent care clinic since your last visit? Hospitalized since your last visit? No    2. Have you seen or consulted any other health care providers outside of the [address] [place] [address] since your last visit? Include any pap smears or colon screening. Yes  MCV  2021  Mammogram    Patient requesting to have COVID antibodies drawn to check for immunity. Also blood typing, explain to patient possible will have to pay out of pocket. Patient acknowledged.

## 2021-11-10 LAB
ALBUMIN SERPL-MCNC: 4.3 G/DL (ref 3.8–4.9)
ALBUMIN/GLOB SERPL: 1.3 {RATIO} (ref 1.2–2.2)
ALP SERPL-CCNC: 114 IU/L (ref 44–121)
ALT SERPL-CCNC: 14 IU/L (ref 0–32)
APPEARANCE UR: CLEAR
AST SERPL-CCNC: 16 IU/L (ref 0–40)
BASOPHILS # BLD AUTO: 0.1 X10E3/UL (ref 0–0.2)
BASOPHILS NFR BLD AUTO: 1 %
BILIRUB SERPL-MCNC: 0.3 MG/DL (ref 0–1.2)
BILIRUB UR QL STRIP: NEGATIVE
BUN SERPL-MCNC: 11 MG/DL (ref 6–24)
BUN/CREAT SERPL: 11 (ref 9–23)
CALCIUM SERPL-MCNC: 9.9 MG/DL (ref 8.7–10.2)
CHLORIDE SERPL-SCNC: 104 MMOL/L (ref 96–106)
CHOLEST SERPL-MCNC: 212 MG/DL (ref 100–199)
CO2 SERPL-SCNC: 25 MMOL/L (ref 20–29)
COLOR UR: YELLOW
CREAT SERPL-MCNC: 1.02 MG/DL (ref 0.57–1)
EOSINOPHIL # BLD AUTO: 0.3 X10E3/UL (ref 0–0.4)
EOSINOPHIL NFR BLD AUTO: 3 %
ERYTHROCYTE [DISTWIDTH] IN BLOOD BY AUTOMATED COUNT: 12 % (ref 11.7–15.4)
EST. AVERAGE GLUCOSE BLD GHB EST-MCNC: 114 MG/DL
GLOBULIN SER CALC-MCNC: 3.2 G/DL (ref 1.5–4.5)
GLUCOSE SERPL-MCNC: 96 MG/DL (ref 65–99)
GLUCOSE UR QL: NEGATIVE
HBA1C MFR BLD: 5.6 % (ref 4.8–5.6)
HCT VFR BLD AUTO: 41.3 % (ref 34–46.6)
HDLC SERPL-MCNC: 69 MG/DL
HGB BLD-MCNC: 13.8 G/DL (ref 11.1–15.9)
HGB UR QL STRIP: NEGATIVE
IMM GRANULOCYTES # BLD AUTO: 0 X10E3/UL (ref 0–0.1)
IMM GRANULOCYTES NFR BLD AUTO: 0 %
KETONES UR QL STRIP: NEGATIVE
LDLC SERPL CALC-MCNC: 134 MG/DL (ref 0–99)
LEUKOCYTE ESTERASE UR QL STRIP: NEGATIVE
LYMPHOCYTES # BLD AUTO: 2.3 X10E3/UL (ref 0.7–3.1)
LYMPHOCYTES NFR BLD AUTO: 29 %
MCH RBC QN AUTO: 29.8 PG (ref 26.6–33)
MCHC RBC AUTO-ENTMCNC: 33.4 G/DL (ref 31.5–35.7)
MCV RBC AUTO: 89 FL (ref 79–97)
MICRO URNS: NORMAL
MONOCYTES # BLD AUTO: 0.4 X10E3/UL (ref 0.1–0.9)
MONOCYTES NFR BLD AUTO: 5 %
NEUTROPHILS # BLD AUTO: 4.9 X10E3/UL (ref 1.4–7)
NEUTROPHILS NFR BLD AUTO: 62 %
NITRITE UR QL STRIP: NEGATIVE
PH UR STRIP: 7.5 [PH] (ref 5–7.5)
PLATELET # BLD AUTO: 344 X10E3/UL (ref 150–450)
POTASSIUM SERPL-SCNC: 4.1 MMOL/L (ref 3.5–5.2)
PROT SERPL-MCNC: 7.5 G/DL (ref 6–8.5)
PROT UR QL STRIP: NEGATIVE
RBC # BLD AUTO: 4.63 X10E6/UL (ref 3.77–5.28)
SODIUM SERPL-SCNC: 141 MMOL/L (ref 134–144)
SP GR UR: 1.02 (ref 1–1.03)
SPECIMEN STATUS REPORT, ROLRST: NORMAL
TRIGL SERPL-MCNC: 53 MG/DL (ref 0–149)
TSH SERPL DL<=0.005 MIU/L-ACNC: 1.25 UIU/ML (ref 0.45–4.5)
UROBILINOGEN UR STRIP-MCNC: 1 MG/DL (ref 0.2–1)
VLDLC SERPL CALC-MCNC: 9 MG/DL (ref 5–40)
WBC # BLD AUTO: 7.9 X10E3/UL (ref 3.4–10.8)

## 2022-06-21 ENCOUNTER — VIRTUAL VISIT (OUTPATIENT)
Dept: FAMILY MEDICINE CLINIC | Age: 57
End: 2022-06-21
Payer: COMMERCIAL

## 2022-06-21 DIAGNOSIS — Z71.89 ADVICE GIVEN ABOUT COVID-19 VIRUS INFECTION: ICD-10-CM

## 2022-06-21 DIAGNOSIS — U07.1 COVID-19 VIRUS INFECTION: Primary | ICD-10-CM

## 2022-06-21 PROCEDURE — 99213 OFFICE O/P EST LOW 20 MIN: CPT | Performed by: FAMILY MEDICINE

## 2022-06-21 RX ORDER — AMOXICILLIN 500 MG/1
500 CAPSULE ORAL 3 TIMES DAILY
Qty: 21 CAPSULE | Refills: 0 | Status: SHIPPED | OUTPATIENT
Start: 2022-06-21 | End: 2022-06-28

## 2022-06-21 RX ORDER — BENZONATATE 200 MG/1
200 CAPSULE ORAL
Qty: 14 CAPSULE | Refills: 0 | Status: SHIPPED | OUTPATIENT
Start: 2022-06-21 | End: 2022-06-28

## 2022-06-21 NOTE — LETTER
NOTIFICATION RETURN TO WORK / SCHOOL    6/21/2022 11:24 AM    Ms. Chasidy Henry  [address] [address]      To Whom It May Concern:    Chasidy Henry is currently under the care of [address] [facility]. She will return to work/school on: 6/27/2022    If there are questions or concerns please have the patient contact our office.         Sincerely,      Sherry Duque MD

## 2022-06-21 NOTE — PROGRESS NOTES
HISTORY OF PRESENT ILLNESS, Today's Pt main concerns were provided on virtual visit and Video telemed format,      Jenae Anderson is a 60 y.o. female, present stating that she Went to the concert last week last Thursday, Present on VV for the concern of the current medical conditions,  strarted Sat, almost 5 days ago,  pt is w/ comorbid history and  the pt has been exposed to covid-19 individual, and has been diagnose with ++test resultsPt Have been staying at home for couple of days pt has some low grade fever with dry cough no dyspnea, no ha, not dizzy, nl smell nl taste, no N/V/D, no body ache. over all stating that things are getting better have a good family support at this time, who brings food for the pt , not tired sleeping well , appetite is down,       Current Outpatient Medications   Medication Sig Dispense Refill    cholecalciferol (Vitamin D3) 25 mcg (1,000 unit) cap Take 3,000 Units by mouth daily.  cyanocobalamin 1,000 mcg tablet Take  by mouth daily.  ascorbic acid, vitamin C, (Vitamin C) 500 mg tablet Take 500 mg by mouth daily.  elderberry fruit (ELDERBERRY PO) Take  by mouth. Syrup      MULTIVITAMIN PO Take  by mouth daily.  triamcinolone (ARISTOCORT) 0.5 % topical cream Apply  to affected area two (2) times a day. use thin layer (Patient taking differently: Apply  to affected area two (2) times daily as needed. use thin layer) 454 g 2    varicella-zoster recombinant, PF, (Shingrix, PF,) 50 mcg/0.5 mL susr injection 0.5mL by IntraMUSCular route once now and then repeat in 2-6 months (Patient not taking: Reported on 11/9/2021) 0.5 mL 1    phentermine (ADIPEX-P) 37.5 mg tablet Take 1 Tab by mouth every morning.  Max Daily Amount: 37.5 mg. (Patient not taking: Reported on 11/9/2021) 30 Tab 0     Allergies   Allergen Reactions    Latex Hives    Erythromycin Hives and Itching     Past Medical History:   Diagnosis Date    Constipation     Family history of diabetes mellitus 5/6/2014    Hypercholesterolemia 5/6/2014    Onychomycosis 11/4/2015    Prediabetes 11/4/2015     No past surgical history on file. Family History   Problem Relation Age of Onset    Diabetes Mother     Psychiatric Disorder Mother     Heart Disease Maternal Grandmother      Social History     Tobacco Use    Smoking status: Never Smoker    Smokeless tobacco: Never Used   Substance Use Topics    Alcohol use: Not Currently     Comment: Very ocassionally      Lab Results   Component Value Date/Time    WBC 7.9 11/09/2021 12:00 AM    HGB 13.8 11/09/2021 12:00 AM    HCT 41.3 11/09/2021 12:00 AM    PLATELET 295 66/22/7325 12:00 AM    MCV 89 11/09/2021 12:00 AM     Lab Results   Component Value Date/Time    Hemoglobin A1c 5.6 11/09/2021 12:00 AM    Hemoglobin A1c 5.4 06/28/2018 09:55 AM    Hemoglobin A1c 5.8 (H) 11/04/2015 08:21 AM    Glucose 96 11/09/2021 12:00 AM    LDL, calculated 134 (H) 11/09/2021 12:00 AM    LDL, calculated 101 (H) 06/28/2018 09:55 AM    Creatinine 1.02 (H) 11/09/2021 12:00 AM         Review of Systems   Constitutional: Negative for chills, fever and malaise/fatigue. HENT: Positive for congestion. Negative for nosebleeds and sore throat. Eyes: Negative for blurred vision and pain. Respiratory: Positive for cough. Negative for hemoptysis, sputum production, shortness of breath and wheezing. Cardiovascular: Negative for chest pain and leg swelling. Gastrointestinal: Negative for constipation, diarrhea, nausea and vomiting. Genitourinary: Negative for dysuria and frequency. Musculoskeletal: Negative for joint pain and myalgias. Skin: Negative for itching and rash. Neurological: Negative for dizziness, loss of consciousness, weakness and headaches. Psychiatric/Behavioral: Negative for depression. The patient is not nervous/anxious and does not have insomnia. Physical Exam  Constitutional:       Appearance: She is obese. She is not ill-appearing or toxic-appearing. HENT:      Head: Normocephalic and atraumatic. Mouth/Throat:      Mouth: Mucous membranes are moist.   Neurological:      Mental Status: She is alert and oriented to person, place, and time. Psychiatric:         Mood and Affect: Mood normal.         Behavior: Behavior normal.         Thought Content: Thought content normal.         Judgment: Judgment normal.         ASSESSMENT and PLAN  Diagnoses and all orders for this visit:    1. COVID-19 virus infection    2. Advice given about COVID-19 virus infection    Other orders  -     amoxicillin (AMOXIL) 500 mg capsule; Take 1 Capsule by mouth three (3) times daily for 7 days. -     benzonatate (TESSALON) 200 mg capsule; Take 1 Capsule by mouth two (2) times daily as needed for Cough for up to 7 days. Concern abdout COVID-19 addressed and detailed, pt was told that the best way to prevent illness is by protection, to Wear a facemask , having social distance, and to get tested and re-tested, with contact tracing,  Also was advised to stay in isolation for 10-14 days if any cold sxs present, Pt was also told if develop dyspnea needs to call 911 or go to er, call for furhter advise, pt agreed with today's visit. Pursuant to the emergency declaration under the [address] and [facility] [address] waiver authority and the [employer] and [employer] Act, this Virtual Visit was conducted, with patient's consent, to reduce the patient's risk of exposure to COVID-19 and provide continuity of care for an established patient. Today's recommendations, Services were provided through a Video synchronous discussion virtually to substitute for in-person appointment.

## 2022-06-21 NOTE — PROGRESS NOTES
Chief Complaint   Patient presents with    Positive For Covid-19     1. Have you been to the ER, urgent care clinic since your last visit? Hospitalized since your last visit? No    2. Have you seen or consulted any other health care providers outside of the [address] [place] [address] since your last visit? Include any pap smears or colon screening. No    Call placed to pt. Verified patient with two type of identifiers. tested positive for covid  On 6/20/22,  Symptoms since Saturday,  Taking mucinex with relief.

## 2023-01-04 ENCOUNTER — OFFICE VISIT (OUTPATIENT)
Dept: FAMILY MEDICINE CLINIC | Age: 58
End: 2023-01-04
Payer: COMMERCIAL

## 2023-01-04 VITALS
TEMPERATURE: 98.2 F | HEART RATE: 72 BPM | DIASTOLIC BLOOD PRESSURE: 75 MMHG | WEIGHT: 173 LBS | BODY MASS INDEX: 27.15 KG/M2 | SYSTOLIC BLOOD PRESSURE: 122 MMHG | RESPIRATION RATE: 18 BRPM | HEIGHT: 67 IN

## 2023-01-04 DIAGNOSIS — E78.00 HYPERCHOLESTEROLEMIA: ICD-10-CM

## 2023-01-04 DIAGNOSIS — K64.8 OTHER HEMORRHOIDS: ICD-10-CM

## 2023-01-04 DIAGNOSIS — Z83.3 FAMILY HISTORY OF DIABETES MELLITUS: ICD-10-CM

## 2023-01-04 DIAGNOSIS — Z23 ENCOUNTER FOR IMMUNIZATION: ICD-10-CM

## 2023-01-04 DIAGNOSIS — Z00.00 WELL WOMAN EXAM (NO GYNECOLOGICAL EXAM): Primary | ICD-10-CM

## 2023-01-04 DIAGNOSIS — R73.03 PREDIABETES: ICD-10-CM

## 2023-01-04 DIAGNOSIS — R53.83 OTHER FATIGUE: ICD-10-CM

## 2023-01-04 RX ORDER — HYDROCORTISONE 25 MG/G
CREAM TOPICAL 4 TIMES DAILY
Qty: 30 G | Refills: 3 | Status: SHIPPED | OUTPATIENT
Start: 2023-01-04

## 2023-01-04 NOTE — PROGRESS NOTES
Identified pt with two pt identifiers(name and ). Reviewed record in preparation for visit and have obtained necessary documentation. Chief Complaint   Patient presents with    Annual Wellness Visit        Vitals:    23 1007   BP: 122/75   Pulse: 72   Resp: 18   Temp: 98.2 °F (36.8 °C)   TempSrc: Oral   Weight: 173 lb (78.5 kg)   Height: 5' 7\" (1.702 m)       Health Maintenance Due   Topic    Depression Screen     COVID-19 Vaccine (1)    DTaP/Tdap/Td series (1 - Tdap)    Shingles Vaccine (1 of 2)    Flu Vaccine (1)    A1C test (Diabetic or Prediabetic)        Coordination of Care Questionnaire:  :   1) Have you been to an emergency room, urgent care, or hospitalized since your last visit? If yes, where when, and reason for visit? no       2. Have seen or consulted any other health care provider since your last visit? If yes, where when, and reason for visit? NO      Patient is accompanied by self I have received verbal consent from Radha Reid to discuss any/all medical information while they are present in the room.

## 2023-01-04 NOTE — PROGRESS NOTES
Subjective:   59 y.o. female for Well Woman Check. Her gyne and breast care is done elsewhere by her Ob-Gyne physician. Last pap was in 2020,  Last Mammog in 2022  Last clonoscopy in 2018        Last bone density scan never   Vax's offered but opted        Patient Active Problem List    Diagnosis Date Noted    Prediabetes 11/04/2015    Onychomycosis 11/04/2015    Hypercholesterolemia 05/06/2014    Family history of diabetes mellitus 05/06/2014     Current Outpatient Medications   Medication Sig Dispense Refill    triamcinolone (ARISTOCORT) 0.5 % topical cream Apply  to affected area two (2) times a day. use thin layer (Patient taking differently: Apply  to affected area two (2) times daily as needed. use thin layer) 454 g 2    cholecalciferol (VITAMIN D3) 25 mcg (1,000 unit) cap Take 3,000 Units by mouth daily. cyanocobalamin 1,000 mcg tablet Take  by mouth daily. ascorbic acid, vitamin C, (VITAMIN C) 500 mg tablet Take 500 mg by mouth daily. MULTIVITAMIN PO Take  by mouth daily. elderberry fruit (ELDERBERRY PO) Take  by mouth. Syrup (Patient not taking: Reported on 1/4/2023)      varicella-zoster recombinant, PF, (Shingrix, PF,) 50 mcg/0.5 mL susr injection 0.5mL by IntraMUSCular route once now and then repeat in 2-6 months (Patient not taking: Reported on 11/9/2021) 0.5 mL 1    phentermine (ADIPEX-P) 37.5 mg tablet Take 1 Tab by mouth every morning. Max Daily Amount: 37.5 mg. (Patient not taking: Reported on 11/9/2021) 30 Tab 0     Allergies   Allergen Reactions    Latex Hives    Erythromycin Hives and Itching     Past Medical History:   Diagnosis Date    Constipation     Family history of diabetes mellitus 5/6/2014    Hypercholesterolemia 5/6/2014    Onychomycosis 11/4/2015    Prediabetes 11/4/2015     No past surgical history on file.   Family History   Problem Relation Age of Onset    Diabetes Mother     Psychiatric Disorder Mother     Heart Disease Maternal Grandmother      Social History     Tobacco Use    Smoking status: Never    Smokeless tobacco: Never   Substance Use Topics    Alcohol use: Not Currently     Comment: Very ocassionally        Lab Results   Component Value Date/Time    Hemoglobin A1c 5.6 11/09/2021 12:00 AM    Hemoglobin A1c 5.4 06/28/2018 09:55 AM    Hemoglobin A1c 5.8 (H) 11/04/2015 08:21 AM    Glucose 96 11/09/2021 12:00 AM    LDL, calculated 134 (H) 11/09/2021 12:00 AM    LDL, calculated 101 (H) 06/28/2018 09:55 AM    Creatinine 1.02 (H) 11/09/2021 12:00 AM      Lab Results   Component Value Date/Time    Cholesterol, total 212 (H) 11/09/2021 12:00 AM    HDL Cholesterol 69 11/09/2021 12:00 AM    LDL, calculated 134 (H) 11/09/2021 12:00 AM    LDL, calculated 101 (H) 06/28/2018 09:55 AM    Triglyceride 53 11/09/2021 12:00 AM        Specific concerns today: ***. Review of Systems    Constitutional: no chills and fever,  , nad     HENT: no ear pain or nosebleeds. No blurred vision    Respiratory: no shortness of breath, wheezing cough     Cardiovascular: Has no chest pain, ,and racing heart . Gastrointestinal: No constipation, diarrhea, nausea and vomiting. Genitourinary: No frequency. Musculoskeletal: Negative for joint pain. Skin: no itching, no rash. Neurological: Negative for dizziness, no tremors  Psychiatric/Behavioral: Negative for depression   is not nervous/anxious. and not depressed the patient is not nervous/anxious. Objective:   Blood pressure 122/75, pulse 72, temperature 98.2 °F (36.8 °C), temperature source Oral, resp. rate 18, height 5' 7\" (1.702 m), weight 173 lb (78.5 kg).    Physical Examination:      General:  alert cooperative appears stated for the age  [de-identified]; normocephalic and atraumatic PERRLA extraocular motor intact normal tympanic membrane normal external ear bilaterally, mucosal normal no drainage  Neck: supple no adenopathy no JVD no bruit  Lungs: Clear to auscultation bilaterally no rales rhonchi or wheezes  Heart: Normal regular S1-S2 ,  no murmur  Breast exam deferred  Abdomen: Soft nontender normal bowel sounds   Extremities: Normal range of motion no point tenderness normal pulses no edema  Pelvic/: No lesion, no lymphadenopathy  Skin: Normal no lesion no rash      Assessment/Plan:     lose weight, increase physical activity, follow low fat diet, follow low salt diet, continue present plan    ICD-10-CM ICD-9-CM    1. Well woman exam (no gynecological exam)  Z00.00 V70.0     [V70.0]      2.  Encounter for immunization  Z23 V03.89 ZOSTER (SHINGLES) VACCINE, LIVE, SC INJECTION      TDAP, BOOSTRIX, (AGE 10 YRS+), IM          reviewed diet, exercise and weight control FERRITIN      METABOLIC PANEL, COMPREHENSIVE      TSH 3RD GENERATION      T4, FREE      URINALYSIS W/ RFLX MICROSCOPIC      HEMOGLOBIN A1C WITH EAG      6. Other fatigue  R53.83 780.79 hydrocortisone (ANUSOL-HC) 2.5 % rectal cream      7.  Other hemorrhoids  K64.8 455.6 hydrocortisone (ANUSOL-HC) 2.5 % rectal cream          reviewed diet, exercise and weight control

## 2023-01-04 NOTE — LETTER
1/13/2023    Ms. Doreen Joseph  [id number] [facility] [address]      Dear Doreen Joseph:    Please find your most recent results below. Recent Results (from the past 1344 hour(s))   CBC WITH AUTOMATED DIFF    Collection Time: 01/04/23 12:00 AM   Result Value Ref Range    WBC 8.5 3.4 - 10.8 x10E3/uL    RBC 4.73 3.77 - 5.28 x10E6/uL    HGB 14.3 11.1 - 15.9 g/dL    HCT 42.2 34.0 - 46.6 %    MCV 89 79 - 97 fL    MCH 30.2 26.6 - 33.0 pg    MCHC 33.9 31.5 - 35.7 g/dL    RDW 12.3 11.7 - 15.4 %    PLATELET 617 585 - 231 x10E3/uL    NEUTROPHILS 65 Not Estab. %    Lymphocytes 27 Not Estab. %    MONOCYTES 3 Not Estab. %    EOSINOPHILS 4 Not Estab. %    BASOPHILS 1 Not Estab. %    ABS. NEUTROPHILS 5.5 1.4 - 7.0 x10E3/uL    Abs Lymphocytes 2.3 0.7 - 3.1 x10E3/uL    ABS. MONOCYTES 0.3 0.1 - 0.9 x10E3/uL    ABS. EOSINOPHILS 0.4 0.0 - 0.4 x10E3/uL    ABS. BASOPHILS 0.1 0.0 - 0.2 x10E3/uL    IMMATURE GRANULOCYTES 0 Not Estab. %    ABS. IMM.  GRANS. 0.0 0.0 - 0.1 x10E3/uL   LIPID PANEL    Collection Time: 01/04/23 12:00 AM   Result Value Ref Range    Cholesterol, total 228 (H) 100 - 199 mg/dL    Triglyceride 67 0 - 149 mg/dL    HDL Cholesterol 71 >39 mg/dL    VLDL, calculated 12 5 - 40 mg/dL    LDL, calculated 145 (H) 0 - 99 mg/dL   IRON PROFILE    Collection Time: 01/04/23 12:00 AM   Result Value Ref Range    TIBC 328 250 - 450 ug/dL    UIBC 246 131 - 425 ug/dL    Iron 82 27 - 159 ug/dL    Iron % saturation 25 15 - 55 %   FERRITIN    Collection Time: 01/04/23 12:00 AM   Result Value Ref Range    Ferritin 75 15 - 177 ng/mL   METABOLIC PANEL, COMPREHENSIVE    Collection Time: 01/04/23 12:00 AM   Result Value Ref Range    Glucose 93 70 - 99 mg/dL    BUN 14 6 - 24 mg/dL    Creatinine 0.85 0.57 - 1.00 mg/dL    eGFR 79 >59 mL/min/1.73    BUN/Creatinine ratio 16 9 - 23    Sodium 142 134 - 144 mmol/L    Potassium 5.3 (H) 3.5 - 5.2 mmol/L    Chloride 104 96 - 106 mmol/L    CO2 19 (L) 20 - 29 mmol/L    Calcium 10.2 8.7 - 10.2 mg/dL    Protein, total 7.7 6.0 - 8.5 g/dL    Albumin 4.4 3.8 - 4.9 g/dL    GLOBULIN, TOTAL 3.3 1.5 - 4.5 g/dL    A-G Ratio 1.3 1.2 - 2.2    Bilirubin, total 0.3 0.0 - 1.2 mg/dL    Alk. phosphatase 115 44 - 121 IU/L    AST (SGOT) 22 0 - 40 IU/L    ALT (SGPT) 20 0 - 32 IU/L   TSH 3RD GENERATION    Collection Time: 01/04/23 12:00 AM   Result Value Ref Range    TSH 1.630 0.450 - 4.500 uIU/mL   T4, FREE    Collection Time: 01/04/23 12:00 AM   Result Value Ref Range    T4, Free 1.12 0.82 - 1.77 ng/dL   URINALYSIS W/ RFLX MICROSCOPIC    Collection Time: 01/04/23 12:00 AM   Result Value Ref Range    Specific Gravity 1.025 1.005 - 1.030    pH (UA) 6.0 5.0 - 7.5    Color Yellow Yellow    Appearance Clear Clear    Leukocyte Esterase Negative Negative    Protein Trace Negative/Trace    Glucose Negative Negative    Ketone Trace (A) Negative    Blood Negative Negative    Bilirubin Negative Negative    Urobilinogen 1.0 0.2 - 1.0 mg/dL    Nitrites Negative Negative    Microscopic Examination Comment    HEMOGLOBIN A1C WITH EAG    Collection Time: 01/04/23 12:00 AM   Result Value Ref Range    Hemoglobin A1c 5.6 4.8 - 5.6 %    Estimated average glucose 114 mg/dL         RECOMMENDATIONS:    I am writting to tell you that your lab results are normal    Keep up the good work! Work on diet and exercise. Continue with current  diet and medications.        Please call me if you have any questions: [phone]    Sincerely,      Marci Penaloza MD

## 2023-01-05 LAB
ALBUMIN SERPL-MCNC: 4.4 G/DL (ref 3.8–4.9)
ALBUMIN/GLOB SERPL: 1.3 {RATIO} (ref 1.2–2.2)
ALP SERPL-CCNC: 115 IU/L (ref 44–121)
ALT SERPL-CCNC: 20 IU/L (ref 0–32)
APPEARANCE UR: CLEAR
AST SERPL-CCNC: 22 IU/L (ref 0–40)
BASOPHILS # BLD AUTO: 0.1 X10E3/UL (ref 0–0.2)
BASOPHILS NFR BLD AUTO: 1 %
BILIRUB SERPL-MCNC: 0.3 MG/DL (ref 0–1.2)
BILIRUB UR QL STRIP: NEGATIVE
BUN SERPL-MCNC: 14 MG/DL (ref 6–24)
BUN/CREAT SERPL: 16 (ref 9–23)
CALCIUM SERPL-MCNC: 10.2 MG/DL (ref 8.7–10.2)
CHLORIDE SERPL-SCNC: 104 MMOL/L (ref 96–106)
CHOLEST SERPL-MCNC: 228 MG/DL (ref 100–199)
CO2 SERPL-SCNC: 19 MMOL/L (ref 20–29)
COLOR UR: YELLOW
CREAT SERPL-MCNC: 0.85 MG/DL (ref 0.57–1)
EGFR: 79 ML/MIN/1.73
EOSINOPHIL # BLD AUTO: 0.4 X10E3/UL (ref 0–0.4)
EOSINOPHIL NFR BLD AUTO: 4 %
ERYTHROCYTE [DISTWIDTH] IN BLOOD BY AUTOMATED COUNT: 12.3 % (ref 11.7–15.4)
EST. AVERAGE GLUCOSE BLD GHB EST-MCNC: 114 MG/DL
FERRITIN SERPL-MCNC: 75 NG/ML (ref 15–150)
GLOBULIN SER CALC-MCNC: 3.3 G/DL (ref 1.5–4.5)
GLUCOSE SERPL-MCNC: 93 MG/DL (ref 70–99)
GLUCOSE UR QL STRIP: NEGATIVE
HBA1C MFR BLD: 5.6 % (ref 4.8–5.6)
HCT VFR BLD AUTO: 42.2 % (ref 34–46.6)
HDLC SERPL-MCNC: 71 MG/DL
HGB BLD-MCNC: 14.3 G/DL (ref 11.1–15.9)
HGB UR QL STRIP: NEGATIVE
IMM GRANULOCYTES # BLD AUTO: 0 X10E3/UL (ref 0–0.1)
IMM GRANULOCYTES NFR BLD AUTO: 0 %
IRON SATN MFR SERPL: 25 % (ref 15–55)
IRON SERPL-MCNC: 82 UG/DL (ref 27–159)
KETONES UR QL STRIP: ABNORMAL
LDLC SERPL CALC-MCNC: 145 MG/DL (ref 0–99)
LEUKOCYTE ESTERASE UR QL STRIP: NEGATIVE
LYMPHOCYTES # BLD AUTO: 2.3 X10E3/UL (ref 0.7–3.1)
LYMPHOCYTES NFR BLD AUTO: 27 %
MCH RBC QN AUTO: 30.2 PG (ref 26.6–33)
MCHC RBC AUTO-ENTMCNC: 33.9 G/DL (ref 31.5–35.7)
MCV RBC AUTO: 89 FL (ref 79–97)
MICRO URNS: ABNORMAL
MONOCYTES # BLD AUTO: 0.3 X10E3/UL (ref 0.1–0.9)
MONOCYTES NFR BLD AUTO: 3 %
NEUTROPHILS # BLD AUTO: 5.5 X10E3/UL (ref 1.4–7)
NEUTROPHILS NFR BLD AUTO: 65 %
NITRITE UR QL STRIP: NEGATIVE
PH UR STRIP: 6 [PH] (ref 5–7.5)
PLATELET # BLD AUTO: 362 X10E3/UL (ref 150–450)
POTASSIUM SERPL-SCNC: 5.3 MMOL/L (ref 3.5–5.2)
PROT SERPL-MCNC: 7.7 G/DL (ref 6–8.5)
PROT UR QL STRIP: ABNORMAL
RBC # BLD AUTO: 4.73 X10E6/UL (ref 3.77–5.28)
SODIUM SERPL-SCNC: 142 MMOL/L (ref 134–144)
SP GR UR STRIP: 1.02 (ref 1–1.03)
T4 FREE SERPL-MCNC: 1.12 NG/DL (ref 0.82–1.77)
TIBC SERPL-MCNC: 328 UG/DL (ref 250–450)
TRIGL SERPL-MCNC: 67 MG/DL (ref 0–149)
TSH SERPL DL<=0.005 MIU/L-ACNC: 1.63 UIU/ML (ref 0.45–4.5)
UIBC SERPL-MCNC: 246 UG/DL (ref 131–425)
UROBILINOGEN UR STRIP-MCNC: 1 MG/DL (ref 0.2–1)
VLDLC SERPL CALC-MCNC: 12 MG/DL (ref 5–40)
WBC # BLD AUTO: 8.5 X10E3/UL (ref 3.4–10.8)

## 2023-05-17 RX ORDER — TRIAMCINOLONE ACETONIDE 5 MG/G
CREAM TOPICAL 2 TIMES DAILY
COMMUNITY
Start: 2021-01-21

## 2023-05-17 RX ORDER — ZOSTER VACCINE RECOMBINANT, ADJUVANTED 50 MCG/0.5
KIT INTRAMUSCULAR
COMMUNITY
Start: 2020-12-24

## 2023-05-17 RX ORDER — PHENTERMINE HYDROCHLORIDE 37.5 MG/1
37.5 TABLET ORAL
COMMUNITY
Start: 2020-12-24

## 2023-05-17 RX ORDER — ASCORBIC ACID 500 MG
500 TABLET ORAL DAILY
COMMUNITY

## 2024-07-09 ENCOUNTER — TELEMEDICINE (OUTPATIENT)
Age: 59
End: 2024-07-09
Payer: COMMERCIAL

## 2024-07-09 DIAGNOSIS — L20.9 ATOPIC DERMATITIS IN ADULT: Primary | ICD-10-CM

## 2024-07-09 PROCEDURE — 99213 OFFICE O/P EST LOW 20 MIN: CPT | Performed by: FAMILY MEDICINE

## 2024-07-09 RX ORDER — TRIAMCINOLONE ACETONIDE 5 MG/G
CREAM TOPICAL
Qty: 454 G | Refills: 0 | Status: CANCELLED | OUTPATIENT
Start: 2024-07-09

## 2024-07-09 RX ORDER — TRIAMCINOLONE ACETONIDE 5 MG/G
CREAM TOPICAL 2 TIMES DAILY
Qty: 454 G | Refills: 1 | Status: SHIPPED | OUTPATIENT
Start: 2024-07-09 | End: 2024-07-10 | Stop reason: RX

## 2024-07-09 RX ORDER — HYDROXYZINE HYDROCHLORIDE 25 MG/1
25 TABLET, FILM COATED ORAL
Qty: 10 TABLET | Refills: 0 | Status: SHIPPED | OUTPATIENT
Start: 2024-07-09 | End: 2024-07-19

## 2024-07-09 SDOH — ECONOMIC STABILITY: FOOD INSECURITY: WITHIN THE PAST 12 MONTHS, THE FOOD YOU BOUGHT JUST DIDN'T LAST AND YOU DIDN'T HAVE MONEY TO GET MORE.: NEVER TRUE

## 2024-07-09 SDOH — ECONOMIC STABILITY: FOOD INSECURITY: WITHIN THE PAST 12 MONTHS, YOU WORRIED THAT YOUR FOOD WOULD RUN OUT BEFORE YOU GOT MONEY TO BUY MORE.: NEVER TRUE

## 2024-07-09 SDOH — ECONOMIC STABILITY: HOUSING INSECURITY
IN THE LAST 12 MONTHS, WAS THERE A TIME WHEN YOU DID NOT HAVE A STEADY PLACE TO SLEEP OR SLEPT IN A SHELTER (INCLUDING NOW)?: NO

## 2024-07-09 SDOH — ECONOMIC STABILITY: INCOME INSECURITY: HOW HARD IS IT FOR YOU TO PAY FOR THE VERY BASICS LIKE FOOD, HOUSING, MEDICAL CARE, AND HEATING?: NOT HARD AT ALL

## 2024-07-09 ASSESSMENT — PATIENT HEALTH QUESTIONNAIRE - PHQ9
SUM OF ALL RESPONSES TO PHQ QUESTIONS 1-9: 0
2. FEELING DOWN, DEPRESSED OR HOPELESS: NOT AT ALL
SUM OF ALL RESPONSES TO PHQ9 QUESTIONS 1 & 2: 0
SUM OF ALL RESPONSES TO PHQ QUESTIONS 1-9: 0
1. LITTLE INTEREST OR PLEASURE IN DOING THINGS: NOT AT ALL

## 2024-07-09 NOTE — PROGRESS NOTES
Lauren Man, was evaluated through a synchronous (real-time) audio-video encounter. The patient (or guardian if applicable) is aware that this is a billable service, which includes applicable co-pays. This Virtual Visit was conducted with patient's (and/or legal guardian's) consent. Patient identification was verified, and a caregiver was present when appropriate.   The patient was located at Home: [address] [place] [id number]  Provider was located at Facility (Appt Dept): [address] [place] [id number]  Confirm you are appropriately licensed, registered, or certified to deliver care in the state where the patient is located as indicated above. If you are not or unsure, please re-schedule the visit: Yes, I confirm.     Lauren Man (:  1965) is a Established patient, presenting virtually for evaluation of the following:  Rash of the neck  Started few days ago not better tried alcohol washing and OTC antibiotic ointments, does not tingles and not pain full, states that is notexpanding red, and not  swelled up, after sun expo with sunscreens w/out sunscreen has no trouble, with itchiness     Constitutional: no chills and fever,  , nad     HENT: no ear pain or nosebleeds. No blurred vision  Respiratory: no shortness of breath, wheezing cough   Cardiovascular: Has no chest pain, ,and racing heart .   Gastrointestinal: No constipation, diarrhea, nausea and vomiting.   Genitourinary: No frequency.   Musculoskeletal: Negative for joint pain.   Skin: ++ itching, ++rash.   Neurological: Negative for dizziness, no tremors  Psychiatric/Behavioral: Negative for depression   is not nervous/anxious.   and not depressed the patient is not nervous/anxious.    Assessment & Plan   Below is the assessment and plan developed based on review of pertinent history, physical exam, labs, studies, and medications.  1. Atopic dermatitis in adult  -     hydrOXYzine HCl (ATARAX) 25 MG tablet; Take

## 2024-07-09 NOTE — PROGRESS NOTES
Chief Complaint   Patient presents with    Allergic Reaction     Rash on arms        \"Have you been to the ER, urgent care clinic since your last visit?  Hospitalized since your last visit?\"    NO    “Have you seen or consulted any other health care providers outside of [facility] since your last visit?”    NO       Have you had a mammogram?”   NO    Date of last Mammogram: 2021         The patient, Lauren Man, identity was verified by name and

## 2024-07-09 NOTE — TELEPHONE ENCOUNTER
Patient call for refill of triamcinolone cream patient used for skin rash a few years ago.  (Last rx 1/21/21).  Lelia March    Last appointment: 1/4/23 Tonia   Next appointment: None- OV past due  Previous refill encounter(s): 1/21/21 454g + 2    Requested Prescriptions     Pending Prescriptions Disp Refills    triamcinolone (ARISTOCORT) 0.5 % cream 454 g 0     Sig: Apply thin layer topically 2 times daily as needed.     For Pharmacy Admin Tracking Only    Program: Medication Refill  CPA in place:    Recommendation Provided To:   Intervention Detail: New Rx: 1, reason: Patient Preference  Intervention Accepted By:   Gap Closed?:    Time Spent (min): 5

## 2024-07-10 ENCOUNTER — TELEPHONE (OUTPATIENT)
Age: 59
End: 2024-07-10

## 2024-07-10 RX ORDER — TRIAMCINOLONE ACETONIDE 5 MG/G
CREAM TOPICAL 2 TIMES DAILY
Qty: 15 G | Refills: 3 | Status: SHIPPED | OUTPATIENT
Start: 2024-07-10

## 2024-07-10 NOTE — TELEPHONE ENCOUNTER
Patient can be reached at [phone] and would like to be called back about the cream that was prescribed, needs an alternative.

## 2024-07-10 NOTE — TELEPHONE ENCOUNTER
Received call from pharmacy.  Triamcinolone does not come n 454 g as prescribed.    Verbal Order  give with Readback for triamcinolone same instructions.   15grams tubes with 3 refills per William Randall MD , pt notified

## 2024-07-23 ENCOUNTER — TELEPHONE (OUTPATIENT)
Age: 59
End: 2024-07-23

## 2024-09-16 ENCOUNTER — OFFICE VISIT (OUTPATIENT)
Age: 59
End: 2024-09-16
Payer: COMMERCIAL

## 2024-09-16 VITALS
TEMPERATURE: 97.1 F | OXYGEN SATURATION: 98 % | HEART RATE: 64 BPM | WEIGHT: 173 LBS | HEIGHT: 67 IN | BODY MASS INDEX: 27.15 KG/M2 | RESPIRATION RATE: 16 BRPM | SYSTOLIC BLOOD PRESSURE: 129 MMHG | DIASTOLIC BLOOD PRESSURE: 82 MMHG

## 2024-09-16 DIAGNOSIS — R73.03 PREDIABETES: ICD-10-CM

## 2024-09-16 DIAGNOSIS — Z00.00 ENCOUNTER FOR WELL ADULT EXAM WITHOUT ABNORMAL FINDINGS: Primary | ICD-10-CM

## 2024-09-16 DIAGNOSIS — E78.00 HYPERCHOLESTEROLEMIA: ICD-10-CM

## 2024-09-16 DIAGNOSIS — Z83.3 FAMILY HISTORY OF DIABETES MELLITUS: ICD-10-CM

## 2024-09-16 PROCEDURE — 99396 PREV VISIT EST AGE 40-64: CPT | Performed by: FAMILY MEDICINE

## 2024-09-16 RX ORDER — HYDROCORTISONE 2.5 %
CREAM (GRAM) TOPICAL 4 TIMES DAILY
Qty: 28 G | Refills: 5 | Status: SHIPPED | OUTPATIENT
Start: 2024-09-16

## 2024-09-16 ASSESSMENT — PATIENT HEALTH QUESTIONNAIRE - PHQ9
1. LITTLE INTEREST OR PLEASURE IN DOING THINGS: NOT AT ALL
SUM OF ALL RESPONSES TO PHQ QUESTIONS 1-9: 0
SUM OF ALL RESPONSES TO PHQ QUESTIONS 1-9: 0
2. FEELING DOWN, DEPRESSED OR HOPELESS: NOT AT ALL
SUM OF ALL RESPONSES TO PHQ QUESTIONS 1-9: 0
SUM OF ALL RESPONSES TO PHQ QUESTIONS 1-9: 0
SUM OF ALL RESPONSES TO PHQ9 QUESTIONS 1 & 2: 0

## 2024-09-17 LAB
ALBUMIN SERPL-MCNC: 4.2 G/DL (ref 3.8–4.9)
ALP SERPL-CCNC: 97 IU/L (ref 44–121)
ALT SERPL-CCNC: 15 IU/L (ref 0–32)
APPEARANCE UR: CLEAR
AST SERPL-CCNC: 18 IU/L (ref 0–40)
BASOPHILS # BLD AUTO: 0.1 X10E3/UL (ref 0–0.2)
BASOPHILS NFR BLD AUTO: 1 %
BILIRUB SERPL-MCNC: 0.3 MG/DL (ref 0–1.2)
BILIRUB UR QL STRIP: NEGATIVE
BUN SERPL-MCNC: 16 MG/DL (ref 8–27)
BUN/CREAT SERPL: 19 (ref 12–28)
CALCIUM SERPL-MCNC: 9.9 MG/DL (ref 8.7–10.3)
CHLORIDE SERPL-SCNC: 107 MMOL/L (ref 96–106)
CHOLEST SERPL-MCNC: 192 MG/DL (ref 100–199)
CO2 SERPL-SCNC: 25 MMOL/L (ref 20–29)
COLOR UR: YELLOW
CREAT SERPL-MCNC: 0.84 MG/DL (ref 0.57–1)
EGFRCR SERPLBLD CKD-EPI 2021: 80 ML/MIN/1.73
EOSINOPHIL # BLD AUTO: 0.3 X10E3/UL (ref 0–0.4)
EOSINOPHIL NFR BLD AUTO: 3 %
ERYTHROCYTE [DISTWIDTH] IN BLOOD BY AUTOMATED COUNT: 12.4 % (ref 11.7–15.4)
GLOBULIN SER CALC-MCNC: 3.1 G/DL (ref 1.5–4.5)
GLUCOSE SERPL-MCNC: 93 MG/DL (ref 70–99)
GLUCOSE UR QL STRIP: NEGATIVE
HBA1C MFR BLD: 5.7 % (ref 4.8–5.6)
HCT VFR BLD AUTO: 41.2 % (ref 34–46.6)
HDLC SERPL-MCNC: 73 MG/DL
HGB BLD-MCNC: 13.7 G/DL (ref 11.1–15.9)
HGB UR QL STRIP: NEGATIVE
IMM GRANULOCYTES # BLD AUTO: 0 X10E3/UL (ref 0–0.1)
IMM GRANULOCYTES NFR BLD AUTO: 0 %
KETONES UR QL STRIP: NEGATIVE
LDLC SERPL CALC-MCNC: 109 MG/DL (ref 0–99)
LEUKOCYTE ESTERASE UR QL STRIP: NEGATIVE
LYMPHOCYTES # BLD AUTO: 2.1 X10E3/UL (ref 0.7–3.1)
LYMPHOCYTES NFR BLD AUTO: 27 %
MCH RBC QN AUTO: 30.4 PG (ref 26.6–33)
MCHC RBC AUTO-ENTMCNC: 33.3 G/DL (ref 31.5–35.7)
MCV RBC AUTO: 91 FL (ref 79–97)
MONOCYTES # BLD AUTO: 0.4 X10E3/UL (ref 0.1–0.9)
MONOCYTES NFR BLD AUTO: 5 %
NEUTROPHILS # BLD AUTO: 4.9 X10E3/UL (ref 1.4–7)
NEUTROPHILS NFR BLD AUTO: 64 %
NITRITE UR QL STRIP: NEGATIVE
PH UR STRIP: 6 [PH] (ref 5–7.5)
PLATELET # BLD AUTO: 323 X10E3/UL (ref 150–450)
POTASSIUM SERPL-SCNC: 5 MMOL/L (ref 3.5–5.2)
PROT SERPL-MCNC: 7.3 G/DL (ref 6–8.5)
PROT UR QL STRIP: NEGATIVE
RBC # BLD AUTO: 4.51 X10E6/UL (ref 3.77–5.28)
SODIUM SERPL-SCNC: 143 MMOL/L (ref 134–144)
SP GR UR STRIP: 1.02 (ref 1–1.03)
T4 FREE SERPL-MCNC: 1.07 NG/DL (ref 0.82–1.77)
TRIGL SERPL-MCNC: 55 MG/DL (ref 0–149)
TSH SERPL DL<=0.005 MIU/L-ACNC: 1.2 UIU/ML (ref 0.45–4.5)
UROBILINOGEN UR STRIP-MCNC: 0.2 MG/DL (ref 0.2–1)
VLDLC SERPL CALC-MCNC: 10 MG/DL (ref 5–40)
WBC # BLD AUTO: 7.8 X10E3/UL (ref 3.4–10.8)